# Patient Record
Sex: FEMALE | Race: WHITE | HISPANIC OR LATINO | Employment: FULL TIME | URBAN - METROPOLITAN AREA
[De-identification: names, ages, dates, MRNs, and addresses within clinical notes are randomized per-mention and may not be internally consistent; named-entity substitution may affect disease eponyms.]

---

## 2017-06-12 LAB
ABO + RH BLD: NORMAL
C TRACH RRNA SPEC QL PROBE: NEGATIVE
HCT VFR BLD AUTO: 42 % (ref 36–46)
HGB BLD-MCNC: 13.7 G/DL (ref 12–16)
INDIRECT COOMBS: NEGATIVE
MCV RBC AUTO: 92 FL (ref 82–108)
N GONORRHOEAE, AMPLIFIED DNA: NEGATIVE
PLATELET # BLD AUTO: 246 K/ΜL (ref 150–399)
RPR: NONREACTIVE
RUBELLA IMMUNE STATUS: NORMAL

## 2017-06-19 DIAGNOSIS — Z34.91 NORMAL PREGNANCY, FIRST TRIMESTER: Primary | ICD-10-CM

## 2017-07-10 ENCOUNTER — OFFICE VISIT (OUTPATIENT)
Dept: MATERNAL FETAL MEDICINE | Facility: CLINIC | Age: 27
End: 2017-07-10
Payer: MEDICAID

## 2017-07-10 DIAGNOSIS — Z36.89 ENCOUNTER TO ESTABLISH GESTATIONAL AGE USING ULTRASOUND: ICD-10-CM

## 2017-07-10 DIAGNOSIS — Z36.89 ENCOUNTER FOR FETAL ANATOMIC SURVEY: Primary | ICD-10-CM

## 2017-07-10 DIAGNOSIS — Z34.91 NORMAL PREGNANCY, FIRST TRIMESTER: ICD-10-CM

## 2017-07-10 PROCEDURE — 76801 OB US < 14 WKS SINGLE FETUS: CPT | Mod: PBBFAC | Performed by: OBSTETRICS & GYNECOLOGY

## 2017-07-10 PROCEDURE — 76801 OB US < 14 WKS SINGLE FETUS: CPT | Mod: 26,S$PBB,, | Performed by: OBSTETRICS & GYNECOLOGY

## 2017-07-10 PROCEDURE — 99499 UNLISTED E&M SERVICE: CPT | Mod: S$PBB,,, | Performed by: OBSTETRICS & GYNECOLOGY

## 2017-07-10 NOTE — PROGRESS NOTES
Indication  ========    Estimation of gestational age.    Method  ======    Transabdominal ultrasound examination. View: Sufficient.    Pregnancy  =========    Crawford pregnancy. Number of fetuses: 1.    Dating  ======    LMP on: 5/4/2017  GA by LMP 9 w + 4 d  NAOMI by LMP: 2/8/2018  Ultrasound examination on: 7/10/2017  GA by U/S based upon: CRL  GA by U/S 9 w + 0 d  NAOMI by U/S: 2/12/2018    Assessment  ==========    Gestational sac: visualized  Location: intrauterine  Yolk sac: visualized  Amniotic sac: visualized  Embryo: visualized  CRL 22.6 mm 9w 0d Hadlock  Cardiac activity: present   bpm  Other: Hypoechoic area adjacent to the gestational sac measuring [id number]    Maternal Structures  ===============    Uterus / Cervix  Uterus: Normal  Ovaries / Tubes / Adnexa  Rt ovary: Normal  Rt ovary D1 2.8 cm  Rt ovary D2 1.4 cm  Rt ovary D3 1.7 cm  Rt ovary mean 2.0 cm  Rt ovary vol 3.6 cm³  Lt ovary: Normal  Lt ovarian corpus luteum: visualized  Lt ovary D1 3.5 cm  Lt ovary D2 2.6 cm  Lt ovary D3 2.2 cm  Lt ovary mean 2.8 cm  Lt ovary vol 10.6 cm³  Lt ovarian corpus luteum D1 17.0 mm  Lt ovarian corpus luteum D2 16.0 mm  Lt ovarian corpus luteum D3 17.0 mm    Impression  =========    Single viable intrauterine pregnancy NOT consistent with LMP. NAOMI adjusted accordingly.  Embryo grossly WNL with normal cardiac activity.  Normal uterus, cervix and adnexae as noted above.  No fluid seen in cul-de-sac.    Recommendation  ==============    Repeat ultrasound study as clinically indicated.

## 2017-09-14 ENCOUNTER — OFFICE VISIT (OUTPATIENT)
Dept: MATERNAL FETAL MEDICINE | Facility: CLINIC | Age: 27
End: 2017-09-14
Payer: MEDICAID

## 2017-09-14 DIAGNOSIS — Z36.89 ENCOUNTER FOR FETAL ANATOMIC SURVEY: ICD-10-CM

## 2017-09-14 PROCEDURE — 99499 UNLISTED E&M SERVICE: CPT | Mod: S$PBB,,, | Performed by: OBSTETRICS & GYNECOLOGY

## 2017-09-14 PROCEDURE — 76805 OB US >/= 14 WKS SNGL FETUS: CPT | Mod: PBBFAC | Performed by: OBSTETRICS & GYNECOLOGY

## 2017-09-14 PROCEDURE — 76817 TRANSVAGINAL US OBSTETRIC: CPT | Mod: 26,S$PBB,, | Performed by: OBSTETRICS & GYNECOLOGY

## 2017-09-14 PROCEDURE — 76805 OB US >/= 14 WKS SNGL FETUS: CPT | Mod: 26,S$PBB,, | Performed by: OBSTETRICS & GYNECOLOGY

## 2017-09-14 PROCEDURE — 76817 TRANSVAGINAL US OBSTETRIC: CPT | Mod: PBBFAC | Performed by: OBSTETRICS & GYNECOLOGY

## 2017-09-14 NOTE — PROGRESS NOTES
OB Ultrasound Report (see PDF version under imaging tab)    Indication  ========    Fetal anatomy survey.    History  ======    Previous Outcomes   1  Para 0    Pregnancy History  ===============    Maternal Lab Tests  Test: Quad/ Penta Screen  Date: 2017  Result: DS risk of 1:1511; T18 risk not increased (AFP Tetra)  Wants to know gender: no    Method  ======    Transabdominal and transvaginal ultrasound examination. View: Good view.    Pregnancy  =========    Crawford pregnancy. Number of fetuses: 1.    Dating  ======    LMP on: 2017  Cycle: regular cycle  GA by LMP 19 w + 0 d  NAOMI by LMP: 2018  Ultrasound examination on: 2017  GA by U/S based upon: AC, BPD, Femur, HC  GA by U/S 18 w + 5 d  NAOMI by U/S: 2/10/2018  Assigned: Dating performed on 2017, based on the LMP  Assigned GA 19 w + 0 d  Assigned NAOMI: 2018    General Evaluation  ===============    Cardiac activity: present.  bpm.  Fetal movements: visualized.  Presentation: Variable lie.  Placenta:  Placental site: anterior.  Umbilical cord: normal, 3 vessel cord.  Amniotic fluid: normal amount.    Fetal Biometry  ===========    Fetal Biometry  BPD 41.8 mm 18w 5d Hadlock  OFD 55.2 mm 19w 4d Júnior  .0 mm 18w 5d Hadlock  .0 mm 19w 0d Hadlock  Femur 28.7 mm 18w 6d Hadlock  Cerebellum tr 19.1 mm 19w 1d [place]  CM 2.9 mm  Nuchal fold 4.19 mm  Humerus 28.3 mm 19w 1d Júnior   g  Calculated by: Hadlock (BPD-HC-AC-FL)  EFW (lb) 0 lb  EFW (oz) 9 oz  Cephalic index 0.76  HC / AC 1.17  FL / BPD 0.69  FL / AC 0.21   bpm  Head / Face / Neck   6.6 mm    Fetal Anatomy  ===========    Cranium: normal  Lateral ventricles: normal  Choroid plexus: normal  Midline falx: normal  Cavum septi pellucidi: normal  Cerebellum: normal  Cisterna magna: normal  Rt lateral ventricle: normal  Lt lateral ventricle: normal  Rt choroid plexus: normal  Nuchal  fold: normal  Lips: normal  Profile: normal  Nose: normal  RVOT: normal  LVOT: normal  4-chamber view: 4-chamber normal, septum normal  Situs: normal  Aortic arch: normal  Ductal arch: normal  3-vessel view: normal  Cardiac position: normal  Cardiac axis: normal  Cardiac size: normal  Cardiac rhythm: normal  Rt lung: normal  Lt lung: normal  Diaphragm: normal  Cord insertion: normal  Stomach: normal  Kidneys: normal  Bladder: normal  Genitals: normal  Abdom. wall: appears normal  Abdom. cavity: normal  Rt kidney: normal  Lt kidney: normal  Cervical spine: normal  Thoracic spine: normal  Lumbar spine: normal  Sacral spine: normal  Arms: normal  Legs: normal  Rt arm: normal  Lt arm: normal  Rt upper arm: normal  Rt forearm: normal  Rt hand: visualized  Rt hand: open  Lt upper arm: normal  Lt forearm: normal  Lt hand: visualized  Lt hand: open  Rt leg: normal  Lt leg: normal  Rt upper leg: normal  Rt lower leg: normal  Rt foot: normal  Lt upper leg: normal  Lt lower leg: normal  Lt foot: normal  Position of feet: normal  Wants to know gender: no    Maternal Structures  ===============    Uterus / Cervix  Uterus: Normal  Approach: w/o pressure  Cervical length 38.0 mm  Second approach: with pressure  Cervical length (2) 32.0 mm  Ovaries / Tubes / Adnexa  Rt ovary: Visualized  Lt ovary: Visualized    Impression  =========    A hancock living IUP is identified. Fetal size is appropriate for established dating. No fetal structural malformations are identified.  Cervical length could not be adequately assessed by TA scanning; therefore, a transvaginal scan was also performed. Cervical length  is normal, and placental location is normal without evidence of previa. Follow-up ultrasound as clinically indicated.

## 2017-10-31 LAB
GLUCOSE SERPL-MCNC: 86 MG/DL
HCT VFR BLD AUTO: 38 % (ref 36–46)
HGB BLD-MCNC: 12.7 G/DL (ref 12–16)
MCV RBC AUTO: 93 FL (ref 82–108)
PLATELET # BLD AUTO: 232 K/ΜL (ref 150–399)

## 2018-01-12 ENCOUNTER — INITIAL PRENATAL (OUTPATIENT)
Dept: OBSTETRICS AND GYNECOLOGY | Facility: CLINIC | Age: 28
End: 2018-01-12
Payer: MEDICAID

## 2018-01-12 VITALS — SYSTOLIC BLOOD PRESSURE: 100 MMHG | WEIGHT: 145.5 LBS | DIASTOLIC BLOOD PRESSURE: 60 MMHG

## 2018-01-12 DIAGNOSIS — Z34.03 ENCOUNTER FOR SUPERVISION OF NORMAL FIRST PREGNANCY IN THIRD TRIMESTER: Primary | ICD-10-CM

## 2018-01-12 PROBLEM — Z34.93 ENCOUNTER FOR SUPERVISION OF NORMAL PREGNANCY IN THIRD TRIMESTER: Status: ACTIVE | Noted: 2018-01-12

## 2018-01-12 PROBLEM — Z34.00 SUPERVISION OF NORMAL FIRST PREGNANCY, ANTEPARTUM: Status: ACTIVE | Noted: 2018-01-12

## 2018-01-12 PROCEDURE — 99213 OFFICE O/P EST LOW 20 MIN: CPT | Mod: PBBFAC,PO | Performed by: OBSTETRICS & GYNECOLOGY

## 2018-01-12 PROCEDURE — 99999 PR PBB SHADOW E&M-EST. PATIENT-LVL III: CPT | Mod: PBBFAC,,, | Performed by: OBSTETRICS & GYNECOLOGY

## 2018-01-12 PROCEDURE — 99203 OFFICE O/P NEW LOW 30 MIN: CPT | Mod: TH,S$PBB,, | Performed by: OBSTETRICS & GYNECOLOGY

## 2018-01-12 PROCEDURE — 87081 CULTURE SCREEN ONLY: CPT

## 2018-01-12 NOTE — PROGRESS NOTES
Complaints today: Presents as transfer from [facility]. No complaints. Denies contractions, LOF, VB. Reports good FM.     /60   Wt 66 kg (145 lb 8.1 oz)   LMP 2017 (Exact Date)     27 y.o., at 36w1d by Estimated Date of Delivery: 18  Patient Active Problem List   Diagnosis    Supervision of normal first pregnancy, antepartum     OB History    Para Term  AB Living   1             SAB TAB Ectopic Multiple Live Births                  # Outcome Date GA Lbr Magnus/2nd Weight Sex Delivery Anes PTL Lv   1 Current                   Dating reviewed    Allergies and problem list reviewed and updated    Medical and surgical history reviewed    Prenatal labs reviewed and updated    Physical Exam:  ABD: soft, gravid, nontender, size < dates (32cm)    Assessment:  Routine OB    Plan:   Repeat GBS (+ at [facility], but PCN allergic and no sensitivities reported)  HIV, RPR, CBC  Growth US for size < dates  Follow up 1 Weeks, bleeding/pain, kick counts, labor precautions     Elza Tomlin MD  OB/GYN, PGY-2

## 2018-01-16 ENCOUNTER — LAB VISIT (OUTPATIENT)
Dept: LAB | Facility: OTHER | Age: 28
End: 2018-01-16
Payer: MEDICAID

## 2018-01-16 DIAGNOSIS — Z34.03 ENCOUNTER FOR SUPERVISION OF NORMAL FIRST PREGNANCY IN THIRD TRIMESTER: ICD-10-CM

## 2018-01-16 LAB
BASOPHILS # BLD AUTO: 0.02 K/UL
BASOPHILS NFR BLD: 0.2 %
DIFFERENTIAL METHOD: ABNORMAL
EOSINOPHIL # BLD AUTO: 0 K/UL
EOSINOPHIL NFR BLD: 0.3 %
ERYTHROCYTE [DISTWIDTH] IN BLOOD BY AUTOMATED COUNT: 13.6 %
HCT VFR BLD AUTO: 36.6 %
HGB BLD-MCNC: 12.1 G/DL
LYMPHOCYTES # BLD AUTO: 1.5 K/UL
LYMPHOCYTES NFR BLD: 12.9 %
MCH RBC QN AUTO: 31.1 PG
MCHC RBC AUTO-ENTMCNC: 33.1 G/DL
MCV RBC AUTO: 94 FL
MONOCYTES # BLD AUTO: 0.6 K/UL
MONOCYTES NFR BLD: 5 %
NEUTROPHILS # BLD AUTO: 9.4 K/UL
NEUTROPHILS NFR BLD: 81.3 %
PLATELET # BLD AUTO: 231 K/UL
PMV BLD AUTO: 12.9 FL
RBC # BLD AUTO: 3.89 M/UL
WBC # BLD AUTO: 11.51 K/UL

## 2018-01-16 PROCEDURE — 86592 SYPHILIS TEST NON-TREP QUAL: CPT

## 2018-01-16 PROCEDURE — 36415 COLL VENOUS BLD VENIPUNCTURE: CPT

## 2018-01-16 PROCEDURE — 85025 COMPLETE CBC W/AUTO DIFF WBC: CPT

## 2018-01-17 LAB
BACTERIA SPEC AEROBE CULT: NORMAL
RPR SER QL: NORMAL

## 2018-01-19 ENCOUNTER — OFFICE VISIT (OUTPATIENT)
Dept: MATERNAL FETAL MEDICINE | Facility: CLINIC | Age: 28
End: 2018-01-19
Payer: MEDICAID

## 2018-01-19 DIAGNOSIS — Z34.03 ENCOUNTER FOR SUPERVISION OF NORMAL FIRST PREGNANCY IN THIRD TRIMESTER: ICD-10-CM

## 2018-01-19 DIAGNOSIS — Z03.74 SUSPECTED PROBLEM WITH FETAL GROWTH NOT FOUND: ICD-10-CM

## 2018-01-19 DIAGNOSIS — O26.843 SIZE OF FETUS INCONSISTENT WITH DATES IN THIRD TRIMESTER: ICD-10-CM

## 2018-01-19 PROCEDURE — 76816 OB US FOLLOW-UP PER FETUS: CPT | Mod: PBBFAC | Performed by: OBSTETRICS & GYNECOLOGY

## 2018-01-19 PROCEDURE — 76816 OB US FOLLOW-UP PER FETUS: CPT | Mod: 26,S$PBB,, | Performed by: OBSTETRICS & GYNECOLOGY

## 2018-01-19 PROCEDURE — 99499 UNLISTED E&M SERVICE: CPT | Mod: S$PBB,,, | Performed by: OBSTETRICS & GYNECOLOGY

## 2018-01-19 NOTE — LETTER
January 19, 2018      Elza Tomlin MD  [address]  [facility] [address]           [facility]  [address]  [facility] [address]  Phone: [phone]          Patient: Reggie Hargrove   MR Number: [id number]   YOB: 1990   Date of Visit: 1/19/2018       Dear Dr. Elza Tomlin:    Thank you for referring Reggie Hargrove to me for evaluation. Attached you will find relevant portions of my assessment and plan of care.    If you have questions, please do not hesitate to call me. I look forward to following Reggie Hargrove along with you.    Sincerely,    Ravi Flores MD    Enclosure  CC:  No Recipients    If you would like to receive this communication electronically, please contact [email or web address] or [phone] to request more information on [employer] access.    For providers and/or their staff who would like to refer a patient to [facility], please contact us through our one-stop-shop provider referral line, [facility], at [phone].    If you feel you have received this communication in error or would no longer like to receive these types of communications, please e-mail [email or web address]

## 2018-01-19 NOTE — PROGRESS NOTES
Obstetrical ultrasound completed today.  See report in imaging section of EPIC.  Normal growth (23rd) and fluid.

## 2018-01-22 ENCOUNTER — TELEPHONE (OUTPATIENT)
Dept: OBSTETRICS AND GYNECOLOGY | Facility: CLINIC | Age: 28
End: 2018-01-22

## 2018-01-22 NOTE — TELEPHONE ENCOUNTER
----- Message from Tali Meneses sent at 1/22/2018  1:36 PM CST -----  Contact: pt  _ x 1st Request  _  2nd Request  _  3rd Request      Who:pt    Why: calling in regards to an appointment     What Number to Call Back: [phone]    When to Expect a call back: (Before the end of the day)   -- if call after 3:00 call back will be tomorrow.

## 2018-01-26 ENCOUNTER — ROUTINE PRENATAL (OUTPATIENT)
Dept: OBSTETRICS AND GYNECOLOGY | Facility: CLINIC | Age: 28
End: 2018-01-26
Payer: MEDICAID

## 2018-01-26 VITALS — WEIGHT: 145.75 LBS | DIASTOLIC BLOOD PRESSURE: 60 MMHG | SYSTOLIC BLOOD PRESSURE: 100 MMHG

## 2018-01-26 DIAGNOSIS — Z34.03 ENCOUNTER FOR SUPERVISION OF NORMAL FIRST PREGNANCY IN THIRD TRIMESTER: ICD-10-CM

## 2018-01-26 PROCEDURE — 99999 PR PBB SHADOW E&M-EST. PATIENT-LVL II: CPT | Mod: PBBFAC,,, | Performed by: OBSTETRICS & GYNECOLOGY

## 2018-01-26 PROCEDURE — 3008F BODY MASS INDEX DOCD: CPT | Mod: ,,, | Performed by: OBSTETRICS & GYNECOLOGY

## 2018-01-26 PROCEDURE — 99212 OFFICE O/P EST SF 10 MIN: CPT | Mod: PBBFAC,TH,PO | Performed by: OBSTETRICS & GYNECOLOGY

## 2018-01-26 PROCEDURE — 99213 OFFICE O/P EST LOW 20 MIN: CPT | Mod: TH,S$PBB,, | Performed by: OBSTETRICS & GYNECOLOGY

## 2018-01-26 NOTE — PROGRESS NOTES
Complaints today:none  Denies contractions, LOF, VB, decreased fetal movement.  Measuring small and not gaining weight -growth US shows appropriate growth at 23%ile.    /60   Wt 66.1 kg (145 lb 11.6 oz)   LMP 2017 (Exact Date)     27 y.o., at 38w1d by Estimated Date of Delivery: 18  Patient Active Problem List   Diagnosis    Supervision of normal first pregnancy, antepartum     OB History    Para Term  AB Living   1             SAB TAB Ectopic Multiple Live Births                  # Outcome Date GA Lbr Magnus/2nd Weight Sex Delivery Anes PTL Lv   1 Current                   Dating reviewed    Allergies and problem list reviewed and updated    Medical and surgical history reviewed    Prenatal labs reviewed and updated    Physical Exam:  ABD: soft, gravid, nontender  SVE: /-2    Assessment:  IUP at 38w1d    Plan:   Routine PNC   follow up 1 Week, kick counts, labor precautions reviewed    Andreina Vargas M.D.  PGY-3 ObGyn  [phone]

## 2018-02-01 ENCOUNTER — ROUTINE PRENATAL (OUTPATIENT)
Dept: OBSTETRICS AND GYNECOLOGY | Facility: CLINIC | Age: 28
End: 2018-02-01
Payer: MEDICAID

## 2018-02-01 VITALS — DIASTOLIC BLOOD PRESSURE: 60 MMHG | SYSTOLIC BLOOD PRESSURE: 110 MMHG | WEIGHT: 147.69 LBS

## 2018-02-01 DIAGNOSIS — Z34.00 SUPERVISION OF NORMAL FIRST PREGNANCY, ANTEPARTUM: Primary | ICD-10-CM

## 2018-02-01 PROCEDURE — 99999 PR PBB SHADOW E&M-EST. PATIENT-LVL II: CPT | Mod: PBBFAC,,, | Performed by: STUDENT IN AN ORGANIZED HEALTH CARE EDUCATION/TRAINING PROGRAM

## 2018-02-01 PROCEDURE — 3008F BODY MASS INDEX DOCD: CPT | Mod: ,,, | Performed by: STUDENT IN AN ORGANIZED HEALTH CARE EDUCATION/TRAINING PROGRAM

## 2018-02-01 PROCEDURE — 99213 OFFICE O/P EST LOW 20 MIN: CPT | Mod: TH,S$PBB,, | Performed by: STUDENT IN AN ORGANIZED HEALTH CARE EDUCATION/TRAINING PROGRAM

## 2018-02-01 PROCEDURE — 99212 OFFICE O/P EST SF 10 MIN: CPT | Mod: PBBFAC,TH,PO | Performed by: STUDENT IN AN ORGANIZED HEALTH CARE EDUCATION/TRAINING PROGRAM

## 2018-02-01 NOTE — PROGRESS NOTES
Complaints today: none  + Irregular contractions.  No VB, LOF or decreased fetal movement.    LMP 2017 (Exact Date)     27 y.o., at 39w0d by Estimated Date of Delivery: 18  Patient Active Problem List   Diagnosis    Supervision of normal first pregnancy, antepartum     OB History    Para Term  AB Living   1             SAB TAB Ectopic Multiple Live Births                  # Outcome Date GA Lbr Magnus/2nd Weight Sex Delivery Anes PTL Lv   1 Current                   Dating reviewed    Allergies and problem list reviewed and updated    Medical and surgical history reviewed    Prenatal labs reviewed and updated    Physical Exam:  ABD: soft, gravid, nontender  SVE: /-2    Assessment:  IUP at 39w0d    Plan:   Induction scheduled for    Follow up 1 Weeks  Kick counts, labor precautions reviewed  Andreina Vargas M.D.  PGY-3 ObGyn  [phone]

## 2018-02-06 ENCOUNTER — TELEPHONE (OUTPATIENT)
Dept: OBSTETRICS AND GYNECOLOGY | Facility: CLINIC | Age: 28
End: 2018-02-06

## 2018-02-06 NOTE — TELEPHONE ENCOUNTER
----- Message from Jaja Cabrera sent at 2/5/2018  3:01 PM CST -----  Contact: pt  X_  1st Request  _  2nd Request  _  3rd Request        Who:SHADIA EMERSON [[id number]]    Why: Patient states she would like to speak with the staff in regards to being induced on Thursday... Please contact to further discuss and advise    What Number to Call Back: [phone]    When to Expect a call back: (Before the end of the day)   -- if call after 3:00 call back will be tomorrow.

## 2018-02-07 ENCOUNTER — ANESTHESIA (OUTPATIENT)
Dept: OBSTETRICS AND GYNECOLOGY | Facility: OTHER | Age: 28
End: 2018-02-07
Payer: MEDICAID

## 2018-02-07 ENCOUNTER — HOSPITAL ENCOUNTER (INPATIENT)
Facility: OTHER | Age: 28
LOS: 3 days | Discharge: HOME | End: 2018-02-10
Attending: OBSTETRICS & GYNECOLOGY | Admitting: OBSTETRICS & GYNECOLOGY
Payer: MEDICAID

## 2018-02-07 ENCOUNTER — ANESTHESIA EVENT (OUTPATIENT)
Dept: OBSTETRICS AND GYNECOLOGY | Facility: OTHER | Age: 28
End: 2018-02-07
Payer: MEDICAID

## 2018-02-07 DIAGNOSIS — O42.90 LEAKAGE, AMNIOTIC FLUID: Primary | ICD-10-CM

## 2018-02-07 DIAGNOSIS — Z3A.39 39 WEEKS GESTATION OF PREGNANCY: ICD-10-CM

## 2018-02-07 LAB
ABO + RH BLD: NORMAL
BASOPHILS # BLD AUTO: 0.02 K/UL
BASOPHILS NFR BLD: 0.2 %
BLD GP AB SCN CELLS X3 SERPL QL: NORMAL
DIFFERENTIAL METHOD: ABNORMAL
EOSINOPHIL # BLD AUTO: 0 K/UL
EOSINOPHIL NFR BLD: 0.3 %
ERYTHROCYTE [DISTWIDTH] IN BLOOD BY AUTOMATED COUNT: 13.6 %
HCT VFR BLD AUTO: 34.7 %
HGB BLD-MCNC: 11.4 G/DL
HIV1+2 IGG SERPL QL IA.RAPID: NEGATIVE
LYMPHOCYTES # BLD AUTO: 1.5 K/UL
LYMPHOCYTES NFR BLD: 11.4 %
MCH RBC QN AUTO: 30.5 PG
MCHC RBC AUTO-ENTMCNC: 32.9 G/DL
MCV RBC AUTO: 93 FL
MONOCYTES # BLD AUTO: 0.7 K/UL
MONOCYTES NFR BLD: 5.1 %
NEUTROPHILS # BLD AUTO: 10.5 K/UL
NEUTROPHILS NFR BLD: 82.4 %
PLATELET # BLD AUTO: 218 K/UL
PMV BLD AUTO: 12.9 FL
RBC # BLD AUTO: 3.74 M/UL
WBC # BLD AUTO: 12.71 K/UL

## 2018-02-07 PROCEDURE — 86901 BLOOD TYPING SEROLOGIC RH(D): CPT

## 2018-02-07 PROCEDURE — 85025 COMPLETE CBC W/AUTO DIFF WBC: CPT

## 2018-02-07 PROCEDURE — 59025 FETAL NON-STRESS TEST: CPT

## 2018-02-07 PROCEDURE — 86703 HIV-1/HIV-2 1 RESULT ANTBDY: CPT | Mod: 91

## 2018-02-07 PROCEDURE — 59025 FETAL NON-STRESS TEST: CPT | Mod: 26,,, | Performed by: OBSTETRICS & GYNECOLOGY

## 2018-02-07 PROCEDURE — 99285 EMERGENCY DEPT VISIT HI MDM: CPT | Mod: 25

## 2018-02-07 PROCEDURE — 63600175 PHARM REV CODE 636 W HCPCS: Performed by: STUDENT IN AN ORGANIZED HEALTH CARE EDUCATION/TRAINING PROGRAM

## 2018-02-07 PROCEDURE — 25000003 PHARM REV CODE 250: Performed by: STUDENT IN AN ORGANIZED HEALTH CARE EDUCATION/TRAINING PROGRAM

## 2018-02-07 PROCEDURE — 11000001 HC ACUTE MED/SURG PRIVATE ROOM

## 2018-02-07 PROCEDURE — 87340 HEPATITIS B SURFACE AG IA: CPT

## 2018-02-07 PROCEDURE — 99283 EMERGENCY DEPT VISIT LOW MDM: CPT | Mod: 25,,, | Performed by: OBSTETRICS & GYNECOLOGY

## 2018-02-07 PROCEDURE — 86703 HIV-1/HIV-2 1 RESULT ANTBDY: CPT

## 2018-02-07 RX ORDER — CARBOPROST TROMETHAMINE 250 UG/ML
250 INJECTION, SOLUTION INTRAMUSCULAR
Status: DISCONTINUED | OUTPATIENT
Start: 2018-02-07 | End: 2018-02-08

## 2018-02-07 RX ORDER — SODIUM CHLORIDE, SODIUM LACTATE, POTASSIUM CHLORIDE, CALCIUM CHLORIDE 600; 310; 30; 20 MG/100ML; MG/100ML; MG/100ML; MG/100ML
INJECTION, SOLUTION INTRAVENOUS CONTINUOUS
Status: DISCONTINUED | OUTPATIENT
Start: 2018-02-07 | End: 2018-02-08

## 2018-02-07 RX ORDER — OXYTOCIN/RINGER'S LACTATE 20/1000 ML
41.7 PLASTIC BAG, INJECTION (ML) INTRAVENOUS CONTINUOUS
Status: ACTIVE | OUTPATIENT
Start: 2018-02-07 | End: 2018-02-07

## 2018-02-07 RX ORDER — OXYTOCIN/RINGER'S LACTATE 20/1000 ML
20 PLASTIC BAG, INJECTION (ML) INTRAVENOUS ONCE
Status: DISCONTINUED | OUTPATIENT
Start: 2018-02-07 | End: 2018-02-08

## 2018-02-07 RX ORDER — METHYLERGONOVINE MALEATE 0.2 MG/ML
200 INJECTION INTRAVENOUS
Status: DISCONTINUED | OUTPATIENT
Start: 2018-02-07 | End: 2018-02-08

## 2018-02-07 RX ORDER — ONDANSETRON 8 MG/1
8 TABLET, ORALLY DISINTEGRATING ORAL EVERY 8 HOURS PRN
Status: DISCONTINUED | OUTPATIENT
Start: 2018-02-07 | End: 2018-02-08

## 2018-02-07 RX ORDER — OXYTOCIN/RINGER'S LACTATE 20/1000 ML
41.65 PLASTIC BAG, INJECTION (ML) INTRAVENOUS CONTINUOUS
Status: DISCONTINUED | OUTPATIENT
Start: 2018-02-07 | End: 2018-02-08

## 2018-02-07 RX ORDER — MISOPROSTOL 200 UG/1
600 TABLET ORAL
Status: DISCONTINUED | OUTPATIENT
Start: 2018-02-07 | End: 2018-02-08

## 2018-02-07 RX ORDER — SODIUM CHLORIDE 9 MG/ML
INJECTION, SOLUTION INTRAVENOUS
Status: DISCONTINUED | OUTPATIENT
Start: 2018-02-07 | End: 2018-02-08

## 2018-02-07 RX ORDER — OXYTOCIN/RINGER'S LACTATE 20/1000 ML
333 PLASTIC BAG, INJECTION (ML) INTRAVENOUS CONTINUOUS
Status: ACTIVE | OUTPATIENT
Start: 2018-02-07 | End: 2018-02-07

## 2018-02-07 RX ORDER — OXYTOCIN/RINGER'S LACTATE 20/1000 ML
2 PLASTIC BAG, INJECTION (ML) INTRAVENOUS CONTINUOUS
Status: DISCONTINUED | OUTPATIENT
Start: 2018-02-07 | End: 2018-02-08

## 2018-02-07 RX ORDER — OXYTOCIN/RINGER'S LACTATE 20/1000 ML
10 PLASTIC BAG, INJECTION (ML) INTRAVENOUS
Status: DISCONTINUED | OUTPATIENT
Start: 2018-02-07 | End: 2018-02-08

## 2018-02-07 RX ADMIN — SODIUM CHLORIDE, SODIUM LACTATE, POTASSIUM CHLORIDE, AND CALCIUM CHLORIDE: .6; .31; .03; .02 INJECTION, SOLUTION INTRAVENOUS at 08:02

## 2018-02-07 RX ADMIN — Medication 2 MILLI-UNITS/MIN: at 08:02

## 2018-02-08 LAB
BASOPHILS # BLD AUTO: 0.02 K/UL
BASOPHILS NFR BLD: 0.1 %
DIFFERENTIAL METHOD: ABNORMAL
EOSINOPHIL # BLD AUTO: 0.1 K/UL
EOSINOPHIL NFR BLD: 0.4 %
ERYTHROCYTE [DISTWIDTH] IN BLOOD BY AUTOMATED COUNT: 13.7 %
HBV SURFACE AG SERPL QL IA: NEGATIVE
HCT VFR BLD AUTO: 34.2 %
HGB BLD-MCNC: 11.2 G/DL
HIV 1+2 AB+HIV1 P24 AG SERPL QL IA: NEGATIVE
LYMPHOCYTES # BLD AUTO: 1.8 K/UL
LYMPHOCYTES NFR BLD: 9.9 %
MCH RBC QN AUTO: 30.7 PG
MCHC RBC AUTO-ENTMCNC: 32.7 G/DL
MCV RBC AUTO: 94 FL
MONOCYTES # BLD AUTO: 1 K/UL
MONOCYTES NFR BLD: 5.4 %
NEUTROPHILS # BLD AUTO: 15 K/UL
NEUTROPHILS NFR BLD: 83.8 %
PLATELET # BLD AUTO: 213 K/UL
PMV BLD AUTO: 12.9 FL
RBC # BLD AUTO: 3.65 M/UL
WBC # BLD AUTO: 17.95 K/UL

## 2018-02-08 PROCEDURE — 0KQM0ZZ REPAIR PERINEUM MUSCLE, OPEN APPROACH: ICD-10-PCS | Performed by: OBSTETRICS & GYNECOLOGY

## 2018-02-08 PROCEDURE — 85025 COMPLETE CBC W/AUTO DIFF WBC: CPT

## 2018-02-08 PROCEDURE — 25000003 PHARM REV CODE 250: Performed by: STUDENT IN AN ORGANIZED HEALTH CARE EDUCATION/TRAINING PROGRAM

## 2018-02-08 PROCEDURE — 62326 NJX INTERLAMINAR LMBR/SAC: CPT | Performed by: ANESTHESIOLOGY

## 2018-02-08 PROCEDURE — 27800517 HC TRAY,EPIDURAL-CONTINUOUS: Performed by: ANESTHESIOLOGY

## 2018-02-08 PROCEDURE — 27200710 HC EPIDURAL INFUSION PUMP SET: Performed by: ANESTHESIOLOGY

## 2018-02-08 PROCEDURE — 72200005 HC VAGINAL DELIVERY LEVEL II

## 2018-02-08 PROCEDURE — 59409 OBSTETRICAL CARE: CPT | Mod: GB,,, | Performed by: OBSTETRICS & GYNECOLOGY

## 2018-02-08 PROCEDURE — 59409 OBSTETRICAL CARE: CPT | Mod: AA,,, | Performed by: ANESTHESIOLOGY

## 2018-02-08 PROCEDURE — 51702 INSERT TEMP BLADDER CATH: CPT

## 2018-02-08 PROCEDURE — 11000001 HC ACUTE MED/SURG PRIVATE ROOM

## 2018-02-08 PROCEDURE — 25000003 PHARM REV CODE 250: Performed by: ANESTHESIOLOGY

## 2018-02-08 PROCEDURE — 36415 COLL VENOUS BLD VENIPUNCTURE: CPT

## 2018-02-08 RX ORDER — OXYTOCIN/RINGER'S LACTATE 20/1000 ML
41.65 PLASTIC BAG, INJECTION (ML) INTRAVENOUS CONTINUOUS
Status: ACTIVE | OUTPATIENT
Start: 2018-02-08 | End: 2018-02-08

## 2018-02-08 RX ORDER — FENTANYL/BUPIVACAINE/NS/PF 2MCG/ML-.1
PLASTIC BAG, INJECTION (ML) INJECTION
Status: DISPENSED
Start: 2018-02-08 | End: 2018-02-08

## 2018-02-08 RX ORDER — OXYCODONE AND ACETAMINOPHEN 10; 325 MG/1; MG/1
1 TABLET ORAL EVERY 4 HOURS PRN
Status: DISCONTINUED | OUTPATIENT
Start: 2018-02-08 | End: 2018-02-10 | Stop reason: HOSPADM

## 2018-02-08 RX ORDER — SODIUM CITRATE AND CITRIC ACID MONOHYDRATE 334; 500 MG/5ML; MG/5ML
30 SOLUTION ORAL ONCE
Status: DISCONTINUED | OUTPATIENT
Start: 2018-02-08 | End: 2018-02-08

## 2018-02-08 RX ORDER — IBUPROFEN 600 MG/1
600 TABLET ORAL EVERY 6 HOURS
Qty: 90 TABLET | Refills: 0 | Status: SHIPPED | OUTPATIENT
Start: 2018-02-08

## 2018-02-08 RX ORDER — DIPHENHYDRAMINE HCL 25 MG
25 CAPSULE ORAL EVERY 4 HOURS PRN
Status: DISCONTINUED | OUTPATIENT
Start: 2018-02-08 | End: 2018-02-10 | Stop reason: HOSPADM

## 2018-02-08 RX ORDER — IBUPROFEN 600 MG/1
600 TABLET ORAL EVERY 6 HOURS
Status: DISCONTINUED | OUTPATIENT
Start: 2018-02-08 | End: 2018-02-10 | Stop reason: HOSPADM

## 2018-02-08 RX ORDER — DOCUSATE SODIUM 100 MG/1
200 CAPSULE, LIQUID FILLED ORAL 2 TIMES DAILY PRN
Status: DISCONTINUED | OUTPATIENT
Start: 2018-02-08 | End: 2018-02-10 | Stop reason: HOSPADM

## 2018-02-08 RX ORDER — FAMOTIDINE 10 MG/ML
20 INJECTION INTRAVENOUS ONCE
Status: DISCONTINUED | OUTPATIENT
Start: 2018-02-08 | End: 2018-02-08

## 2018-02-08 RX ORDER — OXYCODONE AND ACETAMINOPHEN 5; 325 MG/1; MG/1
1 TABLET ORAL EVERY 4 HOURS PRN
Status: DISCONTINUED | OUTPATIENT
Start: 2018-02-08 | End: 2018-02-10 | Stop reason: HOSPADM

## 2018-02-08 RX ORDER — DIPHENHYDRAMINE HCL 25 MG
25 CAPSULE ORAL ONCE
Status: COMPLETED | OUTPATIENT
Start: 2018-02-08 | End: 2018-02-08

## 2018-02-08 RX ORDER — HYDROCORTISONE 25 MG/G
CREAM TOPICAL 3 TIMES DAILY PRN
Status: DISCONTINUED | OUTPATIENT
Start: 2018-02-08 | End: 2018-02-10 | Stop reason: HOSPADM

## 2018-02-08 RX ORDER — FENTANYL/BUPIVACAINE/NS/PF 2MCG/ML-.1
PLASTIC BAG, INJECTION (ML) INJECTION CONTINUOUS PRN
Status: DISCONTINUED | OUTPATIENT
Start: 2018-02-08 | End: 2018-02-08

## 2018-02-08 RX ORDER — ONDANSETRON 8 MG/1
8 TABLET, ORALLY DISINTEGRATING ORAL EVERY 8 HOURS PRN
Status: DISCONTINUED | OUTPATIENT
Start: 2018-02-08 | End: 2018-02-10 | Stop reason: HOSPADM

## 2018-02-08 RX ORDER — FENTANYL/BUPIVACAINE/NS/PF 2MCG/ML-.1
PLASTIC BAG, INJECTION (ML) INJECTION CONTINUOUS
Status: DISCONTINUED | OUTPATIENT
Start: 2018-02-08 | End: 2018-02-08

## 2018-02-08 RX ORDER — DIPHENHYDRAMINE HYDROCHLORIDE 50 MG/ML
25 INJECTION INTRAMUSCULAR; INTRAVENOUS EVERY 4 HOURS PRN
Status: DISCONTINUED | OUTPATIENT
Start: 2018-02-08 | End: 2018-02-10 | Stop reason: HOSPADM

## 2018-02-08 RX ADMIN — SODIUM CHLORIDE, SODIUM LACTATE, POTASSIUM CHLORIDE, AND CALCIUM CHLORIDE: .6; .31; .03; .02 INJECTION, SOLUTION INTRAVENOUS at 12:02

## 2018-02-08 RX ADMIN — Medication 10 ML/HR: at 12:02

## 2018-02-08 RX ADMIN — OXYCODONE HYDROCHLORIDE AND ACETAMINOPHEN 1 TABLET: 5; 325 TABLET ORAL at 05:02

## 2018-02-08 RX ADMIN — IBUPROFEN 600 MG: 600 TABLET, FILM COATED ORAL at 11:02

## 2018-02-08 RX ADMIN — OXYCODONE HYDROCHLORIDE AND ACETAMINOPHEN 1 TABLET: 5; 325 TABLET ORAL at 10:02

## 2018-02-08 RX ADMIN — IBUPROFEN 600 MG: 600 TABLET, FILM COATED ORAL at 05:02

## 2018-02-08 RX ADMIN — DIPHENHYDRAMINE HYDROCHLORIDE 25 MG: 25 CAPSULE ORAL at 04:02

## 2018-02-08 RX ADMIN — DOCUSATE SODIUM 200 MG: 100 CAPSULE, LIQUID FILLED ORAL at 10:02

## 2018-02-08 RX ADMIN — ONDANSETRON 8 MG: 8 TABLET, ORALLY DISINTEGRATING ORAL at 02:02

## 2018-02-08 NOTE — PROGRESS NOTES
LABOR PROGRESS NOTE    S:  MD to bedside for cervical exam. Pt comfortable with epidural    O: Temp:  [96.8 °F (36 °C)-98.3 °F (36.8 °C)] 97.9 °F (36.6 °C)  Pulse:  [63-96] 78  Resp:  [16-20] 20  SpO2:  [91 %-100 %] 100 %  BP: ()/(52-82) 110/55    FHT: 120 BPM/moderate beat to beat variability/-accels/occ early decels Cat 1 (reassuring)  CTX: q2-3  SVE: 9.5/90/0, pitocin @ 10 mU    ASSESSMENT:   27 y.o.  at 39w6d, labor augmentation    FHT reassuring    Active Hospital Problems    Diagnosis  POA    *Normal labor and delivery [O80]  Not Applicable      Resolved Hospital Problems    Diagnosis Date Resolved POA   No resolved problems to display.     PLAN:    Labor management  Continue Close Maternal/Fetal Monitoring.   Labor course:   1515: SROM @ home  1830: /-3 in KYUNG, pitocin initiated @ 2 mU  2230: 3/70/-2, pitocin @ 4 mU, IUPC placed  0015: epidural placed  1245: /-1, per nursing the IUPC fell out, given cervical change and adequate ctx pattern, will not place another IUPC at this time, continue pitocin @ 8 mU  0230: /0, continue pitocin @ 8mU/min  0430: 9.590/0, pitocin @ 10 mU  Recheck 1-2 hours or PRN    Marci Witt MD

## 2018-02-08 NOTE — HOSPITAL COURSE
2018 Admit to L&D for labor augmentation given SROM @ 1515 today. Augmented with pitocin, epidural placed, IUPC placed. SVE with no complications, second degree laceration.  2018 PPD#1 s/p . Doing well this morning, meeting all postpartum goals (ambulating, pain well-controlled, tolerating reg diet without n/v, passing flatus, voiding spontaneously).  02/10/2018 PPD#2 s/p . She continues to be doing well this morning, meeting goals

## 2018-02-08 NOTE — PROGRESS NOTES
02/08/18 0021   Vital Signs   BP (!) 142/82   MAP (mmHg) 104   Pulse 96   Heart Rate Source NIBP   SpO2 100 %   sitting for epidural

## 2018-02-08 NOTE — H&P
[facility]  Obstetrics  History & Physical    Patient Name: Reggie Hargrove  MRN: [id number]  Admission Date: 2018  Primary Care Provider: Primary Doctor No    Subjective:     Principal Problem:Normal labor and delivery    History of Present Illness:  Reggie Hargrove is a 27 y.o. F at 39w6d admitted from [facility] for SROM.  Patient denies contractions, denies vaginal bleeding, reports LOF.   Fetal Movement: normal.  This IUP is uncomplicated.        Obstetric History       T0      L0     SAB0   TAB0   Ectopic0   Multiple0   Live Births0       # Outcome Date GA Lbr Magnus/2nd Weight Sex Delivery Anes PTL Lv   1 Current                 History reviewed. No pertinent past medical history.  History reviewed. No pertinent surgical history.      (Not in a hospital admission)    Review of patient's allergies indicates:   Allergen Reactions    Pcn [penicillins]         Family History     Problem Relation (Age of Onset)    Breast cancer Paternal Grandmother, Maternal Grandmother    Colon cancer Paternal Grandmother, Maternal Grandmother    Ovarian cancer Paternal Grandmother, Maternal Grandmother        Social History Main Topics    Smoking status: Never Smoker    Smokeless tobacco: Never Used    Alcohol use No    Drug use: No    Sexual activity: Yes     Review of Systems   Constitutional: Negative for activity change, chills and fatigue.   Eyes: Negative for visual disturbance.   Respiratory: Negative for shortness of breath.    Cardiovascular: Negative for chest pain and palpitations.   Gastrointestinal: Negative for abdominal pain, constipation, diarrhea, nausea and vomiting.   Genitourinary: Positive for vaginal discharge (amniotic fluid). Negative for dysuria, vaginal bleeding, vaginal pain and vaginal odor.   Musculoskeletal: Negative for myalgias.   Skin:  Negative for rash.   Neurological: Negative for headaches.   Psychiatric/Behavioral: Negative for depression.      Objective:     Vital  Signs (Most Recent):  Temp: 96.8 °F (36 °C) (18)  Pulse: 82 (18 1834)  Resp: 18 (18 174)  BP: (!) 88/52 (18)  SpO2: 99 % (18) Vital Signs (24h Range):  Temp:  [96.8 °F (36 °C)] 96.8 °F (36 °C)  Pulse:  [78-84] 82  Resp:  [18] 18  SpO2:  [98 %-100 %] 99 %  BP: ()/(52-76) 88/52      There is no height or weight on file to calculate BMI.    FHT: Cat 1 (reassuring) 140 bpm, + accels, - decels, moderate BTBV  TOCO: no contractions, minimal irritability    Physical Exam:   Constitutional: She is oriented to person, place, and time. She appears well-developed and well-nourished.    HENT:   Head: Normocephalic and atraumatic.    Eyes: Conjunctivae and EOM are normal. Pupils are equal, round, and reactive to light.    Neck: Normal range of motion. Neck supple.    Cardiovascular: Normal rate, regular rhythm and normal heart sounds.     Pulmonary/Chest: Effort normal and breath sounds normal. No respiratory distress.        Abdominal: Soft. Bowel sounds are normal. She exhibits no distension. There is no tenderness. There is no rebound and no guarding.             Musculoskeletal: Normal range of motion and moves all extremeties.       Neurological: She is alert and oriented to person, place, and time.    Skin: Skin is warm and dry. No rash noted.    Psychiatric: She has a normal mood and affect. Her behavior is normal. Judgment and thought content normal.     Cervix:  Dilation:  2  Effacement:  75%  Station: -3  Presentation: Vertex     Significant Labs:  Lab Results   Component Value Date    GROUPTRH O POS 2017    STREPBCULT No Group B Streptococcus isolated 2018     I have personallly reviewed all pertinent lab results from the last 24 hours.    Assessment/Plan:     27 y.o. female  at 39w6d for:    * Normal labor and delivery    - Admit to Labor and Delivery unit  - Consents for delivery including vaginal or  section and blood transfusion signed and  to chart  - Risks, benefits, alternatives and possible complications have been discussed in detail with the patient.   - Epidural per Anesthesia  - Draw CBC, T&S, HIV, HepBsAg  - Notify Staff  - will start pitocin immediately for labor augmentation  - Recheck in 2 hrs or PRN      Post-Partum Hemorrhage risk - low             Ginger Medina MD  Obstetrics  [facility]

## 2018-02-08 NOTE — PROGRESS NOTES
LABOR PROGRESS NOTE    S:  MD to bedside for cervical exam. Complaints: No.  No epidural at this time    O: Temp:  [96.8 °F (36 °C)-98.3 °F (36.8 °C)] 98.3 °F (36.8 °C)  Pulse:  [73-92] 73  Resp:  [18-20] 18  SpO2:  [92 %-100 %] 99 %  BP: ()/(52-76) 116/73      FHT: 130 BPM/moderate beat to beat variability/-accels/-decels Cat 1 (reassuring)  CTX: irregular  SVE: 3/70/-2, SROM @ 1515, IUPC placed, pitocin @ 4 mU    ASSESSMENT:   27 y.o.  at 39w6d, labor augmentation    FHT reassuring    Active Hospital Problems    Diagnosis  POA    *Normal labor and delivery [O80]  Not Applicable      Resolved Hospital Problems    Diagnosis Date Resolved POA   No resolved problems to display.     PLAN:    Labor management  Continue Close Maternal/Fetal Monitoring.   Labor course:   1515: SROM @ home  1830: /-3 in KYUNG, pitocin initiated @ 2 mU  2230: 3/70/-2, pitocin @ 4 mU, IUPC placed  Recheck 2 hours or PRN    Contractions are inadequate and irregular.   Increase pitocin per protocol to titrate to no more than 5 ctx/10 min  Epidural per anesthesia when indicated    Ginger Medina MD

## 2018-02-08 NOTE — ANESTHESIA PROCEDURE NOTES
Epidural    Patient location during procedure: OB   Reason for block: primary anesthetic   Diagnosis: iup in labor   Start time: 2/8/2018 12:05 AM  Timeout: 2/8/2018 12:04 AM  End time: 2/8/2018 12:25 AM  Staffing  Anesthesiologist: MELINDA HERRING  Resident/CRNA: TIERNEY WEI  Performed: resident/CRNA   Preanesthetic Checklist  Completed: patient identified, site marked, surgical consent, pre-op evaluation, timeout performed, IV checked, risks and benefits discussed, monitors and equipment checked, anesthesia consent given, hand hygiene performed and patient being monitored  Preparation  Patient position: sitting  Prep: ChloraPrep  Patient monitoring: Pulse Ox and Blood Pressure  Epidural  Skin Anesthetic: lidocaine 1%  Skin Wheal: 3 mL  Administration type: continuous  Approach: midline  Interspace: L3-4  Injection technique: SUNNI saline  Needle and Epidural Catheter  Needle type: Tuohy   Needle gauge: 17  Needle length: 3.5 inches  Needle insertion depth: 4 cm  Catheter type: springwound  Catheter size: 19 G  Catheter at skin depth: 9 cm  Test dose: 3 mL of lidocaine 1.5% with Epi 1-to-200,000  Additional Documentation: incremental injection, negative aspiration for heme and CSF, no paresthesia on injection, no signs/symptoms of IV or SA injection, no significant complaints from patient and no significant pain on injection  Needle localization: anatomical landmarks  Medications:  Bolus administered: 10 mL of 0.1% bupivacaine  Volume per aspiration: 5 mL  Time between aspirations: 3 minutes  Assessment  Ease of block: easy  Patient's tolerance of the procedure: comfortable throughout block and no complaints  Post dural Puncture Headache?: No

## 2018-02-08 NOTE — PROGRESS NOTES
LABOR PROGRESS NOTE    S:  MD to bedside for cervical exam. Pt just got epidural, starting to feel more comfortable.    O: Temp:  [96.8 °F (36 °C)-98.3 °F (36.8 °C)] 97.9 °F (36.6 °C)  Pulse:  [63-96] 70  Resp:  [16-20] 16  SpO2:  [91 %-100 %] 100 %  BP: ()/(52-82) 117/68    FHT: 125 BPM/moderate beat to beat variability/-accels/-decels Cat 1 (reassuring)  CTX: q2-3  SVE: 3/70/-2, SROM @ 1515, IUPC placed, pitocin @ 8 mU    ASSESSMENT:   27 y.o.  at 39w6d, labor augmentation    FHT reassuring    Active Hospital Problems    Diagnosis  POA    *Normal labor and delivery [O80]  Not Applicable      Resolved Hospital Problems    Diagnosis Date Resolved POA   No resolved problems to display.     PLAN:    Labor management  Continue Close Maternal/Fetal Monitoring.   Labor course:   1515: SROM @ home  1830: 3 in KYUNG, pitocin initiated @ 2 mU  2230: 3/70/-2, pitocin @ 4 mU, IUPC placed  0015: epidural placed  1245: 1, per nursing the IUPC fell out, given cervical change and adequate ctx pattern, will not place another IUPC at this time, continue pitocin @ 8 mU  Recheck 2 hours or PRN    Marci Witt MD

## 2018-02-08 NOTE — SUBJECTIVE & OBJECTIVE
Obstetric History       T0      L0     SAB0   TAB0   Ectopic0   Multiple0   Live Births0       # Outcome Date GA Lbr Magnus/2nd Weight Sex Delivery Anes PTL Lv   1 Current                 History reviewed. No pertinent past medical history.  History reviewed. No pertinent surgical history.      (Not in a hospital admission)    Review of patient's allergies indicates:   Allergen Reactions    Pcn [penicillins]         Family History     Problem Relation (Age of Onset)    Breast cancer Paternal Grandmother, Maternal Grandmother    Colon cancer Paternal Grandmother, Maternal Grandmother    Ovarian cancer Paternal Grandmother, Maternal Grandmother        Social History Main Topics    Smoking status: Never Smoker    Smokeless tobacco: Never Used    Alcohol use No    Drug use: No    Sexual activity: Yes     Review of Systems   Constitutional: Negative for activity change, chills and fatigue.   Eyes: Negative for visual disturbance.   Respiratory: Negative for shortness of breath.    Cardiovascular: Negative for chest pain and palpitations.   Gastrointestinal: Negative for abdominal pain, constipation, diarrhea, nausea and vomiting.   Genitourinary: Positive for vaginal discharge (amniotic fluid). Negative for dysuria, vaginal bleeding, vaginal pain and vaginal odor.   Musculoskeletal: Negative for myalgias.   Skin:  Negative for rash.   Neurological: Negative for headaches.   Psychiatric/Behavioral: Negative for depression.      Objective:     Vital Signs (Most Recent):  Temp: 96.8 °F (36 °C) (18)  Pulse: 82 (18)  Resp: 18 (18)  BP: (!) 88/52 (18)  SpO2: 99 % (18) Vital Signs (24h Range):  Temp:  [96.8 °F (36 °C)] 96.8 °F (36 °C)  Pulse:  [78-84] 82  Resp:  [18] 18  SpO2:  [98 %-100 %] 99 %  BP: ()/(52-76) 88/52      There is no height or weight on file to calculate BMI.    FHT: Cat 1 (reassuring) 140 bpm, + accels, - decels, moderate  BTBV  TOCO: no contractions, minimal irritability    Physical Exam:   Constitutional: She is oriented to person, place, and time. She appears well-developed and well-nourished.    HENT:   Head: Normocephalic and atraumatic.    Eyes: Conjunctivae and EOM are normal. Pupils are equal, round, and reactive to light.    Neck: Normal range of motion. Neck supple.    Cardiovascular: Normal rate, regular rhythm and normal heart sounds.     Pulmonary/Chest: Effort normal and breath sounds normal. No respiratory distress.        Abdominal: Soft. Bowel sounds are normal. She exhibits no distension. There is no tenderness. There is no rebound and no guarding.             Musculoskeletal: Normal range of motion and moves all extremeties.       Neurological: She is alert and oriented to person, place, and time.    Skin: Skin is warm and dry. No rash noted.    Psychiatric: She has a normal mood and affect. Her behavior is normal. Judgment and thought content normal.     Cervix:  Dilation:  2  Effacement:  75%  Station: -3  Presentation: Vertex     Significant Labs:  Lab Results   Component Value Date    GROUPTRH O POS 06/12/2017    STREPBCULT No Group B Streptococcus isolated 01/12/2018     I have personallly reviewed all pertinent lab results from the last 24 hours.

## 2018-02-08 NOTE — ED PROVIDER NOTES
Encounter Date: 2018       History     Chief Complaint   Patient presents with    Rupture of Membranes     Reggie Hargrove is a 27 y.o. F at 39w6d presents complaining of leakage of fluid. Experienced gush of fluid at 1515 this afternoon. Was previously scheduled for IOL tomorrow morning.    This IUP is uncomplicated.  Patient denies contractions, denies vaginal bleeding, reports LOF.   Fetal Movement: normal.    Blood Type O POS   GBBS: negative  Rubella: Immune  RPR: NR  HIV: negative  HepB: pending            Review of patient's allergies indicates:   Allergen Reactions    Pcn [penicillins]      History reviewed. No pertinent past medical history.  History reviewed. No pertinent surgical history.  Family History   Problem Relation Age of Onset    Breast cancer Paternal Grandmother     Colon cancer Paternal Grandmother     Ovarian cancer Paternal Grandmother     Breast cancer Maternal Grandmother     Colon cancer Maternal Grandmother     Ovarian cancer Maternal Grandmother      Social History   Substance Use Topics    Smoking status: Never Smoker    Smokeless tobacco: Never Used    Alcohol use No     Review of Systems   Constitutional: Negative for chills, fatigue and fever.   HENT: Negative for congestion.    Eyes: Negative for visual disturbance.   Respiratory: Negative for cough and shortness of breath.    Cardiovascular: Negative for chest pain and palpitations.   Gastrointestinal: Negative for abdominal distention, abdominal pain, constipation, diarrhea, nausea and vomiting.   Genitourinary: Positive for vaginal discharge (amniotic fluid). Negative for difficulty urinating, dysuria, hematuria and vaginal bleeding.   Skin: Negative for rash.   Neurological: Negative for dizziness, seizures, light-headedness and headaches.   Hematological: Does not bruise/bleed easily.   Psychiatric/Behavioral: Negative for dysphoric mood. The patient is not nervous/anxious.        Physical Exam       Physical  Exam    Constitutional: She appears well-developed and well-nourished. She is not diaphoretic. No distress.   HENT:   Head: Normocephalic and atraumatic.   Eyes: Conjunctivae are normal.   Neck: Neck supple.   Cardiovascular: Normal rate and regular rhythm.   Pulmonary/Chest: No respiratory distress.   Abdominal: Soft. There is no tenderness. There is no rebound and no guarding.   Gravid, fundus NT   Genitourinary:   Genitourinary Comments: SSE: normal external genitalia, normal appearing vaginal and cervical mucosa; grossly ruptured  SVE: uterus gravid; no tenderness over bilateral adnexae; no masses palpable  + pooling  + nitrazine     Neurological: She is alert and oriented to person, place, and time.   Skin: Skin is warm.   Psychiatric: She has a normal mood and affect. Her behavior is normal. Judgment and thought content normal.     OB LABOR EXAM:   Pre-Term Labor: No.   Membranes ruptured: Yes.   Method: Sterile vaginal exam per MD and Sterile speculum exam per MD.   Vaginal Bleeding: none present.     Dilatation: 2.   Station: -3.   Effacement: 70%.   Amniotic Fluid Color: normal.   Amniotic Fluid Amount: moderate.         ED Course   Obtain Fetal nonstress test (NST)  Date/Time: 2/7/2018 6:28 PM  Performed by: FELIX JAFFE  Authorized by: FELIX JAFFE     Nonstress Test:     Variability:  6-25 BPM    Decelerations:  None    Accelerations:  15 bpm    Acoustic Stimulator: No      Baseline:  140    Uterine Irritability: No      Contractions:  Not present  Biophysical Profile:     Nonstress Test Interpretation: reactive      Overall Impression:  Reassuring  Post-procedure:     Patient tolerance:  Patient tolerated the procedure well with no immediate complications      Labs Reviewed - No data to display          Medical Decision Making:   Initial Assessment:   IUP @ 39.6, SROM  Differential Diagnosis:   SROM  Normal labor  ED Management:  - admit to L&D  - pitocin for labor  augmentation  Other:   I have discussed this case with another health care provider.              Attending Attestation:   Physician Attestation Statement for Resident:  As the supervising MD   Physician Attestation Statement: I have personally seen and examined this patient.   I agree with the above history. -:   As the supervising MD I agree with the above PE.    As the supervising MD I agree with the above treatment, course, plan, and disposition.  I was personally present during the critical portions of the procedure(s) performed by the resident and was immediately available in the ED to provide services and assistance as needed during the entire procedure.  I have reviewed and agree with the residents interpretation of the following: rhythm strips.  I have reviewed the following: old records at this facility.                    ED Course as of Feb 10 2036   Wed Feb 07, 2018   1822 Ms. Hargrove is here at 39wga with gross ROM.  Fetal status is reactive and reassuring.  Will admit to labor and delivery  [HU]      ED Course User Index  [HU] Sammi Davila DO     Clinical Impression:         G1 at 39.6wga with rupture of membranes                 Gniger Medina MD  Resident  02/07/18 1837       Sammi Davila DO  02/10/18 2038

## 2018-02-08 NOTE — ASSESSMENT & PLAN NOTE
- Admit to Labor and Delivery unit  - Consents for delivery including vaginal or  section and blood transfusion signed and to chart  - Risks, benefits, alternatives and possible complications have been discussed in detail with the patient.   - Epidural per Anesthesia  - Draw CBC, T&S, HIV, HepBsAg  - Notify Staff  - will start pitocin immediately for labor augmentation  - Recheck in 2 hrs or PRN      Post-Partum Hemorrhage risk - low

## 2018-02-08 NOTE — HPI
Reggie Hargrove is a 27 y.o. F at 39w6d admitted from [facility] for SROM.  Patient denies contractions, denies vaginal bleeding, reports LOF.   Fetal Movement: normal.  This IUP is uncomplicated.

## 2018-02-08 NOTE — L&D DELIVERY NOTE
[facility]  Vaginal Delivery   Operative Note    SUMMARY     Normal spontaneous vaginal delivery of live infant, was placed on mothers abdomen for skin to skin and bulb suctioning performed.  Infant delivered position OA over perineum.  Nuchal cord: No.    Spontaneous delivery of placenta and IV pitocin given noting good uterine tone.  2nd degree laceration noted and repaired in normal fashion with chromic.  Patient tolerated delivery well. Sponge needle and lap counted correctly x2.        Indications: Normal labor and delivery  Pregnancy complicated by:   Patient Active Problem List   Diagnosis    Supervision of normal first pregnancy, antepartum    Normal labor and delivery     (spontaneous vaginal delivery)     Admitting GA: 40w0d    Delivery Information for  Fausto Hargrove    Birth information:  YOB: 2017   Time of birth: 6:46 AM   Sex: female   Head Delivery Date/Time: 2018  6:46 AM   Delivery type: Vaginal, Spontaneous Delivery   Gestational Age: 40w0d    Delivery Providers    Delivering clinician:  Ernesto Awan MD   Other personnel:   Provider Role   Erasto Martinez RN Registered Nurse   Mimi Atkinson RN Registered Nurse   Natalia SHERWOOD Wick[place]fe Surgical Tech   Marci Witt MD Resident               [place] Measurements    Weight:  2970 g Length:  47 cm   Head circum.:  33 cm Chest circum.:  31.1 cm          [place] Assessment    Living status:  Living  Apgars:     1 Minute:   5 Minute:   10 Minute:   15 Minute:   20 Minute:     Skin Color:   0  1       Heart Rate:   2  2       Reflex Irritability:   2  2       Muscle Tone:   2  2       Respiratory Effort:   2  2       Total:   8  9                      Assisted Delivery Details:    Forceps attempted?:  No  Vacuum extractor attempted?:  No         Shoulder Dystocia    Shoulder dystocia present?:  No           Presentation and Position    Presentation:   Vertex   Position:       Occiput    Anterior             Interventions/Resuscitation         Cord    Vessels:  3 vessels  Complications:  None  Delayed Cord Clamping?:  Yes  Cord Clamped Date/Time:  2018  6:47 AM  Cord Blood Disposition:  Sent with Baby  Gases Sent?:  No  Stem Cell Collection (by MD):  No       Placenta    Date and time:  2018  6:50 AM  Removal:  Spontaneous  Appearance:  Intact  Placenta disposition:  discarded           Labor Events:       labor: No     Labor Onset Date/Time:         Dilation Complete Date/Time: 2018 05:30     Start Pushing Date/Time: 2018 05:47     Rupture Date/Time: 18  1515         Rupture type:           Fluid Amount:        Fluid Color:        Fluid Odor:        Membrane Status (PeriCalm): SRM (Spontaneous Rupture)      Rupture Date/Time (PeriCalm): 2018 15:15:00      Fluid Amount (PeriCalm): Moderate      Fluid Color (PeriCalm): Clear       steroids: None     Antibiotics given for GBS: No     Induction:       Indications for induction:        Augmentation: oxytocin     Indications for augmentation:       Labor complications: None     Additional complications:          Cervical ripening:                     Delivery:      Episiotomy: None     Indication for Episiotomy:       Perineal Lacerations: 2nd Repaired:      Periurethral Laceration: none Repaired:     Labial Laceration: none Repaired:     Sulcus Laceration: none Repaired:     Vaginal Laceration: No Repaired:     Cervical Laceration: No Repaired:     Repair suture:       Repair # of packets: 2     Vaginal delivery QBL (mL): 150      QBL (mL): 0     Combined Blood Loss (mL): 150     Vaginal Sweep Performed: No     Surgicount Correct: No       Other providers:       Anesthesia    Method:  Epidural          Details (if applicable):  Trial of Labor      Categorization:      Priority:     Indications for :     Incision Type:       Additional  information:  Forceps:    Vacuum:     Breech:    Observed anomalies    Other (Comments):

## 2018-02-09 PROBLEM — O42.90 PROM (PREMATURE RUPTURE OF MEMBRANES): Status: ACTIVE | Noted: 2018-02-07

## 2018-02-09 PROCEDURE — 11000001 HC ACUTE MED/SURG PRIVATE ROOM

## 2018-02-09 PROCEDURE — 25000003 PHARM REV CODE 250: Performed by: STUDENT IN AN ORGANIZED HEALTH CARE EDUCATION/TRAINING PROGRAM

## 2018-02-09 PROCEDURE — 99231 SBSQ HOSP IP/OBS SF/LOW 25: CPT | Mod: ,,, | Performed by: OBSTETRICS & GYNECOLOGY

## 2018-02-09 RX ADMIN — OXYCODONE HYDROCHLORIDE AND ACETAMINOPHEN 1 TABLET: 10; 325 TABLET ORAL at 11:02

## 2018-02-09 RX ADMIN — IBUPROFEN 600 MG: 600 TABLET, FILM COATED ORAL at 06:02

## 2018-02-09 RX ADMIN — IBUPROFEN 600 MG: 600 TABLET, FILM COATED ORAL at 12:02

## 2018-02-09 RX ADMIN — IBUPROFEN 600 MG: 600 TABLET, FILM COATED ORAL at 05:02

## 2018-02-09 RX ADMIN — OXYCODONE HYDROCHLORIDE AND ACETAMINOPHEN 1 TABLET: 10; 325 TABLET ORAL at 02:02

## 2018-02-09 RX ADMIN — IBUPROFEN 600 MG: 600 TABLET, FILM COATED ORAL at 11:02

## 2018-02-09 RX ADMIN — OXYCODONE HYDROCHLORIDE AND ACETAMINOPHEN 1 TABLET: 10; 325 TABLET ORAL at 06:02

## 2018-02-09 RX ADMIN — OXYCODONE HYDROCHLORIDE AND ACETAMINOPHEN 1 TABLET: 10; 325 TABLET ORAL at 10:02

## 2018-02-09 NOTE — LACTATION NOTE
02/09/18 0945   Maternal Infant Feeding   Maternal Emotional State independent;relaxed   Time Spent (min) 0-15 min   Latch Assistance (to call)   lactation rounds. Pt to call for latch assessment/ assistance.

## 2018-02-09 NOTE — DISCHARGE INSTRUCTIONS
Breastfeeding Discharge Instructions       Feed the baby at the earliest sign of hunger or comfort  o Hands to mouth, sucking motions  o Rooting or searching for something to suck on  o Dont wait for crying - it is a sign of distress     The feedings may be 8-12 times per 24hrs and will not follow a schedule   Avoid pacifiers and bottles for the first 4 weeks   Alternate the breast you start the feeding with, or start with the breast that feels the fullest   Switch breasts when the baby takes himself off the breast or falls asleep   Keep offering breasts until the baby looks full, no longer gives hunger signs, and stays asleep when placed on his back in the crib   If the baby is sleepy and wont wake for a feeding, put the baby skin-to-skin dressed in a diaper against the mothers bare chest   Sleep near your baby   The baby should be positioned and latched on to the breast correctly  o Chest-to-chest, chin in the breast  o Babys lips are flipped outward  o Babys mouth is stretched open wide like a shout  o Babys sucking should feel like tugging to the mother  - The baby should be drinking at the breast:  o You should hear swallowing or gulping throughout the feeding  o You should see milk on the babys lips when he comes off the breast  o Your breasts should be softer when the baby is finished feeding  o The baby should look relaxed at the end of feedings  o After the 4th day and your milk is in:  o The babys poop should turn bright yellow and be loose, watery, and seedy  o The baby should have at least 3-4 poops the size of the palm of your hand per day  o The baby should have at least 5-6 wet diapers per day  o The urine should be light yellow in color  You should drink when you are thirsty and eat a healthy diet when you are    hungry.     Take naps to get the rest you need.   Take medications and/or drink alcohol only with permission of your obstetrician    or the babys pediatrician.  You can  also call the Infant Risk Center,   ([phone]), Monday-Friday, 8am-5pm Central time, to get the most   up-to-date evidence-based information on the use of medications during   pregnancy and breastfeeding.      The baby should be examined by a pediatrician at 3-5 days of age.  Once your   milk comes in, the baby should be gaining at least ½ - 1oz each day and should be back to birthweight no later than 10-14 days of age.          Community Resources    [facility] Breastfeeding Warmline: [phone]   Local [facility] clinics: provide incentives and breastpumps to eligible mothers  [employer]he [employer] (LLLI):  mother-to-mother support group website        www.[employer]  Local La Leche League mother-to-mother support groups:        www.[employer]        La Leche League [facility]   Dr. Eliel Fenton website for latch videos and general information:        www.breastfeedinginc.ca  Infant Risk Center is a call center that provides information about the safety of taking medications while breastfeeding.  Call 1-816.617.1803, M-F, 8am-5pm, CT.  International Lactation Consultant Association provides resources for assistance:        www.ilca.org  [facility] Breastfeeding Coalition provides informationand resources for parents  and the community    http://[facility]feeding.org     Betzy Mora is a mom-to-mom support group:                             www.[employer].com//breastfeedng-support/  Partners for Healthy Babies:  2-707-068-BABY(2546)  [employer]: a breastfeeding support group for women of color, 660.985.3344

## 2018-02-09 NOTE — PLAN OF CARE
Problem: Breastfeeding (Adult,Obstetrics,Pediatric)  Goal: Signs and Symptoms of Listed Potential Problems Will be Absent, Minimized or Managed (Breastfeeding)  Signs and symptoms of listed potential problems will be absent, minimized or managed by discharge/transition of care (reference Breastfeeding (Adult,Obstetrics,Pediatric) CPG).    02/09/18 1448   Breastfeeding   Problems Assessed (Breastfeeding) all   Problems Present (Breastfeeding) none   Follow basic education

## 2018-02-09 NOTE — LACTATION NOTE
02/09/18 1315   Maternal Infant Assessment   Breast Density Bilateral:;soft   Areola Bilateral:;elastic   Nipple(s) Bilateral:;everted   Infant Assessment   Sucking Reflex present   Rooting Reflex present   Swallow Reflex present   LATCH Score   Latch 2-->grasps breast, tongue down, lips flanged, rhythmic sucking   Audible Swallowing 1-->a few with stimulation   Type Of Nipple 2-->everted (after stimulation)   Comfort (Breast/Nipple) 2-->soft/nontender   Hold (Positioning) 1-->minimal assist, teach one side: mother does other, staff holds   Score (less than 7 for 2/more consecutive times, consult Lactation Consultant) 8   Breasts WDL   Breasts WDL WDL   Pain/Comfort Assessments   Pain Assessment Performed Yes       Number Scale   Presence of Pain denies   Location nipple(s)   Pain Rating: Rest 0   Pain Rating: Activity 0   Maternal Infant Feeding   Maternal Emotional State assist needed;relaxed   Infant Positioning cross-cradle   Signs of Milk Transfer infant jaw motion present   Presence of Pain no   Comfort Measures Before/During Feeding infant position adjusted;latch adjusted;maternal position adjusted   Time Spent (min) 15-30 min   Latch Assistance yes   Breastfeeding Education adequate infant intake;adequate milk volume;diet;importance of skin-to-skin contact;milk expression, hand   Feeding Infant   Effective Latch During Feeding yes   Suck/Swallow Coordination present   Skin-to-Skin Contact During Feeding yes   Lactation Referrals   Lactation Consult Breastfeeding assessment   Lactation Interventions   Attachment Promotion breastfeeding assistance provided   Breastfeeding Assistance assisted with positioning;feeding cue recognition promoted;feeding on demand promoted;infant latch-on verified;infant suck/swallow verified   Maternal Breastfeeding Support diary/feeding log utilized;encouragement offered;infant-mother separation minimized;lactation counseling provided;maternal hydration promoted;maternal  nutrition promoted;maternal rest encouraged   Latch Promotion positioning assisted   latch assistance provided. Assisted pt with achieving a deeper latch. Cross cradle hold;d. Baby nursing well.

## 2018-02-09 NOTE — SUBJECTIVE & OBJECTIVE
Hospital course: 2018 Admit to L&D for labor augmentation given SROM @ 1515 today. Augmented with pitocin, epidural placed, IUPC placed. SVE with no complications, second degree laceration.  2018 PPD#1 s/p . Doing well this morning, meeting all postpartum goals (ambulating, pain well-controlled, tolerating reg diet without n/v, passing flatus, voiding spontaneously).    Interval History: PPD#1 s/p     She is doing well this morning. She is tolerating a regular diet without nausea or vomiting. She is voiding spontaneously. She is ambulating. She has passed flatus, and has not a BM. Vaginal bleeding is mild. She denies fever or chills. Abdominal pain is mild and controlled with oral medications. She is breastfeeding.     Objective:     Vital Signs (Most Recent):  Temp: 97.6 °F (36.4 °C) (18)  Pulse: 71 (18)  Resp: 18 (18)  BP: (!) 108/57 (18)  SpO2: 97 % (18) Vital Signs (24h Range):  Temp:  [97.6 °F (36.4 °C)-99 °F (37.2 °C)] 97.6 °F (36.4 °C)  Pulse:  [68-88] 71  Resp:  [18] 18  SpO2:  [91 %-100 %] 97 %  BP: ()/(56-72) 108/57     Weight: 65.8 kg (145 lb)  Body mass index is 22.71 kg/m².      Intake/Output Summary (Last 24 hours) at 18  Last data filed at 18 1600   Gross per 24 hour   Intake           942.35 ml   Output             1620 ml   Net          -677.65 ml       Significant Labs:  Lab Results   Component Value Date    GROUPTRH O POS 2018    HEPBSAG Negative 2018    STREPBCULT No Group B Streptococcus isolated 2018       Recent Labs  Lab 18  1929   HGB 11.2*   HCT 34.2*       I have personallly reviewed all pertinent lab results from the last 24 hours.   Physical Exam   Constitutional: She is oriented to person, place, and time. She appears well-developed and well-nourished. No distress.   HENT:   Head: Normocephalic and atraumatic.   Eyes: EOM are normal.   Neck: Normal range of motion.    Cardiovascular: Normal rate.    Pulmonary/Chest: Effort normal. No respiratory distress.   Abdominal: Soft. She exhibits no mass. There is no tenderness. There is no guarding.   Fundus firm below umbilicus   Neurological: She is alert and oriented to person, place, and time.   Skin: Skin is warm and dry. She is not diaphoretic.   Psychiatric: She has a normal mood and affect. Her behavior is normal. Judgment and thought content normal.   Vitals reviewed.

## 2018-02-09 NOTE — ASSESSMENT & PLAN NOTE
Postpartum care:  - Patient doing well. Continue routine management and advances.  - Continue PO pain meds. Pain well controlled.  - Encourage ambulation.   - Heme: Pre Delivery h/h 11.4/34.7 --> Post Delivery h/h pending   - Contraception - condoms  - Lactation - The patient is breastfeeding. Lactation nurse following along PRN  - Rh Status - pos

## 2018-02-09 NOTE — PROGRESS NOTES
[facility]  Obstetrics  Postpartum Progress Note    Patient Name: Reggie Hargrove  MRN: [id number]  Admission Date: 2018  Hospital Length of Stay: 2 days  Attending Physician: CLEMENTINE Javed MD  Primary Care Provider: Primary Doctor No    Subjective:     Principal Problem:PROM (premature rupture of membranes)    Hospital course: 2018 Admit to L&D for labor augmentation given SROM @ 1515 today. Augmented with pitocin, epidural placed, IUPC placed. SVE with no complications, second degree laceration.  2018 PPD#1 s/p . Doing well this morning, meeting all postpartum goals (ambulating, pain well-controlled, tolerating reg diet without n/v, passing flatus, voiding spontaneously).    Interval History: PPD#1 s/p     She is doing well this morning. She is tolerating a regular diet without nausea or vomiting. She is voiding spontaneously. She is ambulating. She has passed flatus, and has not a BM. Vaginal bleeding is mild. She denies fever or chills. Abdominal pain is mild and controlled with oral medications. She is breastfeeding.     Objective:     Vital Signs (Most Recent):  Temp: 97.6 °F (36.4 °C) (18)  Pulse: 71 (18 234)  Resp: 18 (18)  BP: (!) 108/57 (18)  SpO2: 97 % (18) Vital Signs (24h Range):  Temp:  [97.6 °F (36.4 °C)-99 °F (37.2 °C)] 97.6 °F (36.4 °C)  Pulse:  [68-88] 71  Resp:  [18] 18  SpO2:  [91 %-100 %] 97 %  BP: ()/(56-72) 108/57     Weight: 65.8 kg (145 lb)  Body mass index is 22.71 kg/m².      Intake/Output Summary (Last 24 hours) at 18  Last data filed at 18 1600   Gross per 24 hour   Intake           942.35 ml   Output             1620 ml   Net          -677.65 ml       Significant Labs:  Lab Results   Component Value Date    GROUPTRH O POS 2018    HEPBSAG Negative 2018    STREPBCULT No Group B Streptococcus isolated 2018       Recent Labs  Lab 18  1929   HGB 11.2*    HCT 34.2*       I have personallly reviewed all pertinent lab results from the last 24 hours.   Physical Exam   Constitutional: She is oriented to person, place, and time. She appears well-developed and well-nourished. No distress.   HENT:   Head: Normocephalic and atraumatic.   Eyes: EOM are normal.   Neck: Normal range of motion.   Cardiovascular: Normal rate.    Pulmonary/Chest: Effort normal. No respiratory distress.   Abdominal: Soft. She exhibits no mass. There is no tenderness. There is no guarding.   Fundus firm below umbilicus   Neurological: She is alert and oriented to person, place, and time.   Skin: Skin is warm and dry. She is not diaphoretic.   Psychiatric: She has a normal mood and affect. Her behavior is normal. Judgment and thought content normal.   Vitals reviewed.        Assessment/Plan:     27 y.o. female  for:    * PROM (premature rupture of membranes)    - admitted for IOL for SROM  - s/p          (spontaneous vaginal delivery)    Postpartum care:  - Patient doing well. Continue routine management and advances.  - Continue PO pain meds. Pain well controlled.  - Encourage ambulation.   - Heme: Pre Delivery h/h 11.4/34.7 --> Post Delivery h/h pending   - Contraception - condoms  - Lactation - The patient is breastfeeding. Lactation nurse following along PRN  - Rh Status - pos                Disposition: As patient meets milestones, will plan to discharge PPD#1-2.    Sushma K Reddy, MD  Obstetrics  [facility]    Doing well, no questions,no problems.  I have reviewed the resident's note, evaluated the patient and agree with the diagnosis and management plan

## 2018-02-09 NOTE — ANESTHESIA POSTPROCEDURE EVALUATION
"Anesthesia Post Evaluation    Patient: Reggie Hargrove    Procedure(s) Performed: * No procedures listed *    Final Anesthesia Type: epidural  Patient location during evaluation: floor  Patient participation: Yes- Able to Participate  Level of consciousness: awake and alert  Post-procedure vital signs: reviewed and stable  Pain management: adequate  Airway patency: patent  PONV status at discharge: No PONV  Anesthetic complications: no      Cardiovascular status: blood pressure returned to baseline and hemodynamically stable  Respiratory status: unassisted, room air and spontaneous ventilation  Hydration status: euvolemic  Follow-up not needed.        Visit Vitals  /65   Pulse 72   Temp 36.3 °C (97.4 °F) (Oral)   Resp 18   Ht 5' 7" (1.702 m)   Wt 65.8 kg (145 lb)   LMP 05/04/2017 (Exact Date)   SpO2 98%   Breastfeeding? Yes   BMI 22.71 kg/m²       Pain/Salvatore Score: Pain Rating Prior to Med Admin: 7 (2/9/2018 11:00 AM)  Pain Rating Post Med Admin: 0 (2/9/2018 12:00 PM)      "

## 2018-02-09 NOTE — PLAN OF CARE
Problem: Breastfeeding (Adult,Obstetrics,Pediatric)  Goal: Signs and Symptoms of Listed Potential Problems Will be Absent, Minimized or Managed (Breastfeeding)  Signs and symptoms of listed potential problems will be absent, minimized or managed by discharge/transition of care (reference Breastfeeding (Adult,Obstetrics,Pediatric) C[facility]).    02/08/18 1840   Breastfeeding   Problems Assessed (Breastfeeding) all   Problems Present (Breastfeeding) none   Follow basic breast feeding education

## 2018-02-09 NOTE — LACTATION NOTE
02/08/18 1700   Breasts WDL   Breasts WDL WDL       Number Scale   Presence of Pain denies   Location nipple(s)   Pain Rating: Rest 0   Pain Rating: Activity 0   Maternal Infant Feeding   Maternal Emotional State relaxed   Time Spent (min) 15-30 min   Latch Assistance (to call)   Breastfeeding Education adequate infant intake;adequate milk volume;diet;importance of skin-to-skin contact   Lactation Referrals   Lactation Consult Knowledge deficit   Lactation Interventions   Attachment Promotion counseling provided   Breastfeeding Assistance feeding cue recognition promoted;feeding on demand promoted   basic education reviewed. Pt to call for latch assessment

## 2018-02-10 VITALS
WEIGHT: 145 LBS | OXYGEN SATURATION: 98 % | TEMPERATURE: 97 F | RESPIRATION RATE: 18 BRPM | BODY MASS INDEX: 22.76 KG/M2 | DIASTOLIC BLOOD PRESSURE: 62 MMHG | SYSTOLIC BLOOD PRESSURE: 106 MMHG | HEIGHT: 67 IN | HEART RATE: 74 BPM

## 2018-02-10 PROBLEM — Z34.00 SUPERVISION OF NORMAL FIRST PREGNANCY, ANTEPARTUM: Status: RESOLVED | Noted: 2018-01-12 | Resolved: 2018-02-10

## 2018-02-10 PROBLEM — O42.90 PROM (PREMATURE RUPTURE OF MEMBRANES): Status: RESOLVED | Noted: 2018-02-07 | Resolved: 2018-02-10

## 2018-02-10 PROCEDURE — 99238 HOSP IP/OBS DSCHRG MGMT 30/<: CPT | Mod: ,,, | Performed by: OBSTETRICS & GYNECOLOGY

## 2018-02-10 PROCEDURE — 25000003 PHARM REV CODE 250: Performed by: STUDENT IN AN ORGANIZED HEALTH CARE EDUCATION/TRAINING PROGRAM

## 2018-02-10 RX ORDER — OXYCODONE AND ACETAMINOPHEN 5; 325 MG/1; MG/1
1 TABLET ORAL EVERY 4 HOURS PRN
Qty: 30 TABLET | Refills: 0 | Status: SHIPPED | OUTPATIENT
Start: 2018-02-10

## 2018-02-10 RX ORDER — OXYCODONE AND ACETAMINOPHEN 5; 325 MG/1; MG/1
1 TABLET ORAL EVERY 4 HOURS PRN
Qty: 30 TABLET | Refills: 0 | Status: SHIPPED | OUTPATIENT
Start: 2018-02-10 | End: 2018-02-10

## 2018-02-10 RX ADMIN — IBUPROFEN 600 MG: 600 TABLET, FILM COATED ORAL at 02:02

## 2018-02-10 RX ADMIN — OXYCODONE HYDROCHLORIDE AND ACETAMINOPHEN 1 TABLET: 10; 325 TABLET ORAL at 12:02

## 2018-02-10 RX ADMIN — IBUPROFEN 600 MG: 600 TABLET, FILM COATED ORAL at 09:02

## 2018-02-10 RX ADMIN — OXYCODONE HYDROCHLORIDE AND ACETAMINOPHEN 1 TABLET: 10; 325 TABLET ORAL at 03:02

## 2018-02-10 NOTE — PLAN OF CARE
Problem: Patient Care Overview  Goal: Plan of Care Review  Outcome: Outcome(s) achieved Date Met: 02/10/18  Pt ambulating, voiding, and passing flatus. Pt tolerating po well. No s/s of distress at this time. Pt pain well controlled by oral pain medication. Pt bleeding light throughout shift and fundus is firm. Discharge instructions provided. Instructed to follow up with ob/gyn in 6 weeks, take pain medication as needed. Verbalized understanding. Awaiting transport for discharge.

## 2018-02-10 NOTE — PROGRESS NOTES
[facility]  Obstetrics  Labor Progress Note    Patient Name: Reggie Hargrove  MRN: [id number]  Admission Date: 2018  Hospital Length of Stay: 3 days  Attending Physician: CLEMENTINE Javed MD  Primary Care Provider: Primary Doctor No    Subjective:     Principal Problem: (spontaneous vaginal delivery)    Hospital Course:  2018 Admit to L&D for labor augmentation given SROM @ 1515 today. Augmented with pitocin, epidural placed, IUPC placed. SVE with no complications, second degree laceration.  2018 PPD#1 s/p . Doing well this morning, meeting all postpartum goals (ambulating, pain well-controlled, tolerating reg diet without n/v, passing flatus, voiding spontaneously).  02/10/2018 PPD#2 s/p . She continues to be doing well this morning, meeting goals    Hospital course: 2018 Admit to L&D for labor augmentation given SROM @ 1515 today. Augmented with pitocin, epidural placed, IUPC placed. SVE with no complications, second degree laceration.  2018 PPD#1 s/p . Doing well this morning, meeting all postpartum goals (ambulating, pain well-controlled, tolerating reg diet without n/v, passing flatus, voiding spontaneously).  02/10/2018 PPD#2 s/p . She continues to be doing well this morning, meeting goals    Interval History: Ms. Hargrove is doing well this morning. She is tolerating a regular diet without nausea or vomiting. She is voiding spontaneously. She is ambulating. She has passed flatus, and has not a BM. Vaginal bleeding is moderate. She denies fever or chills. Abdominal pain is mild and controlled with oral medications. She is breastfeeding. She desires circumcision for her male baby: yes.    Objective:     Vital Signs (Most Recent):  Temp: 97.4 °F (36.3 °C) (02/10/18 0730)  Pulse: 74 (02/10/18 0730)  Resp: 18 (02/10/18 0730)  BP: 106/62 (02/10/18 0730)  SpO2: 98 % (02/10/18 0730) Vital Signs (24h Range):  Temp:  [97.4 °F (36.3 °C)-98.7 °F (37.1 °C)] 97.4 °F  (36.3 °C)  Pulse:  [67-74] 74  Resp:  [18] 18  SpO2:  [98 %-99 %] 98 %  BP: ()/(53-62) 106/62     Weight: 65.8 kg (145 lb)  Body mass index is 22.71 kg/m².    No intake or output data in the 24 hours ending 02/10/18 1111    Significant Labs:  Lab Results   Component Value Date    GROUPTRH O POS 2018    HEPBSAG Negative 2018    STREPBCULT No Group B Streptococcus isolated 2018       Recent Labs  Lab 18  1929   HGB 11.2*   HCT 34.2*     Chlamydia: No results for input(s): LABCHLA in the last 48 hours.  I have personallly reviewed all pertinent lab results from the last 24 hours.    Assessment/Plan:     27 y.o. female  at 40w0d for:    *  (spontaneous vaginal delivery)    Postpartum care:  - Patient doing well. Continue routine management and advances.  - Continue PO pain meds. Pain well controlled.  - Encourage ambulation.   - Heme: Pre Delivery h/h 11.4/34.7 --> Post Delivery h/h pending   - Contraception - condoms  - Lactation - The patient is breastfeeding. Lactation nurse following along PRN  - Rh Status - pos                  Haley Lam MD  Obstetrics  [facility]

## 2018-02-10 NOTE — DISCHARGE SUMMARY
[facility]  Obstetrics  Discharge Summary      Patient Name: Reggie Hargrove  MRN: [id number]  Admission Date: 2018  Hospital Length of Stay: 3 days  Discharge Date and Time:  02/10/2018 11:45 AM  Attending Physician: CLEMENTINE Javed MD   Discharging Provider: Haley Lam MD  Primary Care Provider: Primary Doctor No    HPI: Reggie Hargrove is a 27 y.o. F at 39w6d admitted from [facility] for SROM.  Patient denies contractions, denies vaginal bleeding, reports LOF.   Fetal Movement: normal.  This IUP is uncomplicated.      * No surgery found *     Hospital Course:   2018 Admit to L&D for labor augmentation given SROM @ 1515 today. Augmented with pitocin, epidural placed, IUPC placed. SVE with no complications, second degree laceration.  2018 PPD#1 s/p . Doing well this morning, meeting all postpartum goals (ambulating, pain well-controlled, tolerating reg diet without n/v, passing flatus, voiding spontaneously).  02/10/2018 PPD#2 s/p . She continues to be doing well this morning, meeting goals        Final Active Diagnoses:    Diagnosis Date Noted POA    PRINCIPAL PROBLEM:   (spontaneous vaginal delivery) [O80] 2018 Not Applicable      Problems Resolved During this Admission:    Diagnosis Date Noted Date Resolved POA    PROM (premature rupture of membranes) [O42.90] 2018 02/10/2018 Unknown        Labs:   CBC   Recent Labs  Lab 18   WBC 17.95*   HGB 11.2*   HCT 34.2*          Feeding Method: breast    Immunizations     Date Immunization Status Dose Route/Site Given by    18 MMR Incomplete 0.5 mL Subcutaneous/Left deltoid     18 Tdap Incomplete 0.5 mL Intramuscular/Left deltoid           Delivery:    Episiotomy: None   Lacerations: 2nd   Repair suture:     Repair # of packets: 2   Blood loss (ml): 150     Birth information:  YOB: 2017   Time of birth: 6:46 AM   Sex: female   Delivery type: Vaginal,  Spontaneous Delivery   Gestational Age: 40w0d    Delivery Clinician:      Other providers:       Additional  information:  Forceps:    Vacuum:    Breech:    Observed anomalies      Living?:           APGARS  One minute Five minutes Ten minutes   Skin color:         Heart rate:         Grimace:         Muscle tone:         Breathing:         Totals: 8  9        Placenta: Delivered:       appearance    Pending Diagnostic Studies:     None          Discharged Condition: good    Disposition: Home or Self Care    Follow Up:  Follow-up Information     [facility], OB/GYN In 6 weeks.    Why:  For post-partum visit               Patient Instructions:     Activity as tolerated     Lifting restrictions     Other restrictions (specify):   Order Comments: Pelvic rest until postpartum visit. (No sex, no tampons, no douching, etc.)     Call MD for:  temperature >100.4     Call MD for:  persistent nausea and vomiting or diarrhea     Call MD for:  severe uncontrolled pain     Call MD for:  difficulty breathing or increased cough     Call MD for:  severe persistent headache     Call MD for:  persistent dizziness, light-headedness, or visual disturbances     Call MD for:  increased confusion or weakness     Call MD for:   Order Comments: Vaginal bleeding through one or more pads each hour for 2 hours       Medications:  Current Discharge Medication List      START taking these medications    Details   ibuprofen (ADVIL,MOTRIN) 600 MG tablet Take 1 tablet (600 mg total) by mouth every 6 (six) hours.  Qty: 90 tablet, Refills: 0      oxyCODONE-acetaminophen (PERCOCET) 5-325 mg per tablet Take 1 tablet by mouth every 4 (four) hours as needed.  Qty: 30 tablet, Refills: 0             Haley Lam MD  Obstetrics  [facility]    Doing well, no complaints, ready for D/C.

## 2018-02-10 NOTE — SUBJECTIVE & OBJECTIVE
Hospital course: 2018 Admit to L&D for labor augmentation given SROM @ 1515 today. Augmented with pitocin, epidural placed, IUPC placed. SVE with no complications, second degree laceration.  2018 PPD#1 s/p . Doing well this morning, meeting all postpartum goals (ambulating, pain well-controlled, tolerating reg diet without n/v, passing flatus, voiding spontaneously).  02/10/2018 PPD#2 s/p . She continues to be doing well this morning, meeting goals    Interval History: Ms. Hargrove is doing well this morning. She is tolerating a regular diet without nausea or vomiting. She is voiding spontaneously. She is ambulating. She has passed flatus, and has not a BM. Vaginal bleeding is moderate. She denies fever or chills. Abdominal pain is mild and controlled with oral medications. She is breastfeeding. She desires circumcision for her male baby: yes.    Objective:     Vital Signs (Most Recent):  Temp: 97.4 °F (36.3 °C) (02/10/18 0730)  Pulse: 74 (02/10/18 0730)  Resp: 18 (02/10/18 0730)  BP: 106/62 (02/10/18 0730)  SpO2: 98 % (02/10/18 0730) Vital Signs (24h Range):  Temp:  [97.4 °F (36.3 °C)-98.7 °F (37.1 °C)] 97.4 °F (36.3 °C)  Pulse:  [67-74] 74  Resp:  [18] 18  SpO2:  [98 %-99 %] 98 %  BP: ()/(53-62) 106/62     Weight: 65.8 kg (145 lb)  Body mass index is 22.71 kg/m².    No intake or output data in the 24 hours ending 02/10/18 1111    Significant Labs:  Lab Results   Component Value Date    GROUPTRH O POS 2018    HEPBSAG Negative 2018    STREPBCULT No Group B Streptococcus isolated 2018       Recent Labs  Lab 18  192   HGB 11.2*   HCT 34.2*     Chlamydia: No results for input(s): LABCHLA in the last 48 hours.  I have personallly reviewed all pertinent lab results from the last 24 hours.

## 2018-02-10 NOTE — LACTATION NOTE
"   02/10/18 1130   Maternal Infant Assessment   Breast Density soft   Areola elastic   Nipple(s) everted   Infant Assessment   Sucking Reflex present   Rooting Reflex present   Swallow Reflex present   LATCH Score   Latch 1-->repeated attempts, holds nipple in mouth, stimulate to suck   Audible Swallowing 1-->a few with stimulation   Type Of Nipple 2-->everted (after stimulation)   Comfort (Breast/Nipple) 2-->soft/nontender   Hold (Positioning) 1-->minimal assist, teach one side: mother does other, staff holds   Score (less than 7 for 2/more consecutive times, consult Lactation Consultant) 7   Maternal Infant Feeding   Maternal Emotional State independent   Infant Positioning clutch/"football";cross-cradle   Latch Assistance yes   Feeding Infant   Effective Latch During Feeding yes   Audible Swallow yes   Suck/Swallow Coordination present   lactation rounds. Discharge instructions reviewed, including contact numbers and available resources. Reports feedings are going well and denies pain. Infant weight and output adequate. Reviewed what to expect as milk is coming in, how to tell baby is getting enough, manual expression of breastmilk, cue based feeding on demand, skin to skin, etc. Encouraged to call with any questions or concerns. Voices understanding.    "

## 2019-09-26 ENCOUNTER — HOSPITAL ENCOUNTER (EMERGENCY)
Facility: OTHER | Age: 29
Discharge: HOME | End: 2019-09-27
Attending: EMERGENCY MEDICINE
Payer: MEDICAID

## 2019-09-26 DIAGNOSIS — R19.7 NAUSEA, VOMITING, AND DIARRHEA: Primary | ICD-10-CM

## 2019-09-26 DIAGNOSIS — R11.2 NAUSEA, VOMITING, AND DIARRHEA: Primary | ICD-10-CM

## 2019-09-26 LAB
ALBUMIN SERPL BCP-MCNC: 4.6 G/DL (ref 3.5–5.2)
ALP SERPL-CCNC: 93 U/L (ref 55–135)
ALT SERPL W/O P-5'-P-CCNC: 52 U/L (ref 10–44)
ANION GAP SERPL CALC-SCNC: 16 MMOL/L (ref 8–16)
AST SERPL-CCNC: 50 U/L (ref 10–40)
BASOPHILS # BLD AUTO: 0.02 K/UL (ref 0–0.2)
BASOPHILS NFR BLD: 0.1 % (ref 0–1.9)
BILIRUB SERPL-MCNC: 0.6 MG/DL (ref 0.1–1)
BUN SERPL-MCNC: 17 MG/DL (ref 6–20)
CALCIUM SERPL-MCNC: 10.3 MG/DL (ref 8.7–10.5)
CHLORIDE SERPL-SCNC: 96 MMOL/L (ref 95–110)
CK SERPL-CCNC: 159 U/L (ref 20–180)
CO2 SERPL-SCNC: 24 MMOL/L (ref 23–29)
CREAT SERPL-MCNC: 0.9 MG/DL (ref 0.5–1.4)
DIFFERENTIAL METHOD: ABNORMAL
EOSINOPHIL # BLD AUTO: 0 K/UL (ref 0–0.5)
EOSINOPHIL NFR BLD: 0 % (ref 0–8)
ERYTHROCYTE [DISTWIDTH] IN BLOOD BY AUTOMATED COUNT: 13.4 % (ref 11.5–14.5)
EST. GFR  (AFRICAN AMERICAN): >60 ML/MIN/1.73 M^2
EST. GFR  (NON AFRICAN AMERICAN): >60 ML/MIN/1.73 M^2
GLUCOSE SERPL-MCNC: 109 MG/DL (ref 70–110)
HCG INTACT+B SERPL-ACNC: <1.2 MIU/ML
HCT VFR BLD AUTO: 45.2 % (ref 37–48.5)
HGB BLD-MCNC: 15 G/DL (ref 12–16)
IMM GRANULOCYTES # BLD AUTO: 0.03 K/UL (ref 0–0.04)
IMM GRANULOCYTES NFR BLD AUTO: 0.2 % (ref 0–0.5)
LIPASE SERPL-CCNC: 9 U/L (ref 4–60)
LYMPHOCYTES # BLD AUTO: 0.9 K/UL (ref 1–4.8)
LYMPHOCYTES NFR BLD: 6.7 % (ref 18–48)
MAGNESIUM SERPL-MCNC: 2.1 MG/DL (ref 1.6–2.6)
MCH RBC QN AUTO: 29.6 PG (ref 27–31)
MCHC RBC AUTO-ENTMCNC: 33.2 G/DL (ref 32–36)
MCV RBC AUTO: 89 FL (ref 82–98)
MONOCYTES # BLD AUTO: 0.9 K/UL (ref 0.3–1)
MONOCYTES NFR BLD: 6.2 % (ref 4–15)
NEUTROPHILS # BLD AUTO: 12 K/UL (ref 1.8–7.7)
NEUTROPHILS NFR BLD: 86.8 % (ref 38–73)
NRBC BLD-RTO: 0 /100 WBC
PLATELET # BLD AUTO: 249 K/UL (ref 150–350)
PMV BLD AUTO: 12.1 FL (ref 9.2–12.9)
POTASSIUM SERPL-SCNC: 3.7 MMOL/L (ref 3.5–5.1)
PROT SERPL-MCNC: 8 G/DL (ref 6–8.4)
RBC # BLD AUTO: 5.07 M/UL (ref 4–5.4)
SODIUM SERPL-SCNC: 136 MMOL/L (ref 136–145)
WBC # BLD AUTO: 13.79 K/UL (ref 3.9–12.7)

## 2019-09-26 PROCEDURE — 96361 HYDRATE IV INFUSION ADD-ON: CPT

## 2019-09-26 PROCEDURE — 80053 COMPREHEN METABOLIC PANEL: CPT

## 2019-09-26 PROCEDURE — 99284 EMERGENCY DEPT VISIT MOD MDM: CPT | Mod: 25

## 2019-09-26 PROCEDURE — 84702 CHORIONIC GONADOTROPIN TEST: CPT

## 2019-09-26 PROCEDURE — 96375 TX/PRO/DX INJ NEW DRUG ADDON: CPT

## 2019-09-26 PROCEDURE — 63600175 PHARM REV CODE 636 W HCPCS: Performed by: EMERGENCY MEDICINE

## 2019-09-26 PROCEDURE — 83690 ASSAY OF LIPASE: CPT

## 2019-09-26 PROCEDURE — 83735 ASSAY OF MAGNESIUM: CPT

## 2019-09-26 PROCEDURE — 96374 THER/PROPH/DIAG INJ IV PUSH: CPT

## 2019-09-26 PROCEDURE — 85025 COMPLETE CBC W/AUTO DIFF WBC: CPT

## 2019-09-26 PROCEDURE — 82550 ASSAY OF CK (CPK): CPT

## 2019-09-26 RX ORDER — METOCLOPRAMIDE HYDROCHLORIDE 5 MG/ML
10 INJECTION INTRAMUSCULAR; INTRAVENOUS
Status: COMPLETED | OUTPATIENT
Start: 2019-09-26 | End: 2019-09-26

## 2019-09-26 RX ORDER — ONDANSETRON 2 MG/ML
8 INJECTION INTRAMUSCULAR; INTRAVENOUS ONCE AS NEEDED
Status: COMPLETED | OUTPATIENT
Start: 2019-09-26 | End: 2019-09-26

## 2019-09-26 RX ORDER — METOCLOPRAMIDE 10 MG/1
10 TABLET ORAL EVERY 6 HOURS PRN
Qty: 10 TABLET | Refills: 0 | Status: SHIPPED | OUTPATIENT
Start: 2019-09-26 | End: 2020-10-08 | Stop reason: SDUPTHER

## 2019-09-26 RX ORDER — DICYCLOMINE HYDROCHLORIDE 20 MG/1
20 TABLET ORAL 3 TIMES DAILY
Qty: 30 TABLET | Refills: 0 | Status: SHIPPED | OUTPATIENT
Start: 2019-09-26 | End: 2019-10-26

## 2019-09-26 RX ORDER — PROMETHAZINE HYDROCHLORIDE 25 MG/1
25 SUPPOSITORY RECTAL EVERY 6 HOURS PRN
Qty: 4 SUPPOSITORY | Refills: 0 | Status: SHIPPED | OUTPATIENT
Start: 2019-09-26

## 2019-09-26 RX ORDER — DICYCLOMINE HYDROCHLORIDE 20 MG/1
20 TABLET ORAL 3 TIMES DAILY
Qty: 30 TABLET | Refills: 0 | Status: SHIPPED | OUTPATIENT
Start: 2019-09-26 | End: 2019-09-26 | Stop reason: SDUPTHER

## 2019-09-26 RX ADMIN — METOCLOPRAMIDE 10 MG: 5 INJECTION, SOLUTION INTRAMUSCULAR; INTRAVENOUS at 10:09

## 2019-09-26 RX ADMIN — ONDANSETRON 8 MG: 2 INJECTION INTRAMUSCULAR; INTRAVENOUS at 11:09

## 2019-09-26 RX ADMIN — SODIUM CHLORIDE 1500 ML: 0.9 INJECTION, SOLUTION INTRAVENOUS at 10:09

## 2019-09-27 VITALS
WEIGHT: 97 LBS | HEIGHT: 67 IN | HEART RATE: 64 BPM | OXYGEN SATURATION: 99 % | SYSTOLIC BLOOD PRESSURE: 110 MMHG | TEMPERATURE: 98 F | BODY MASS INDEX: 15.22 KG/M2 | RESPIRATION RATE: 18 BRPM | DIASTOLIC BLOOD PRESSURE: 68 MMHG

## 2019-09-27 LAB
B-HCG UR QL: NEGATIVE
CTP QC/QA: YES

## 2019-09-27 NOTE — ED NOTES
Pt tolerated  Two 240 oz cups of water with no N/V  At present time, pt is resting comfortable in bed no distress noted.

## 2019-09-27 NOTE — ED PROVIDER NOTES
Encounter Date: 9/26/2019    SCRIBE #1 NOTE: Praful JONES, am scribing for, and in the presence of, Dr. Barth.       History     Chief Complaint   Patient presents with    Abdominal Cramping     abd cramping, nvd, fatigue x2 days.      Time seen by provider: 10:28 PM    This is a 29 y.o. female who presents with complaint of vomiting onset 28:00 (i.e. 26 hours ago). Pt states her daughter and  were sick for several days before with similar symptoms, but there symptoms resolved. She states she began vomiting last night. She woke up from her sleep with nonstop episodes of vomiting then diarrhea began. Pt reports abdominal pain essentially only with vomiting. She states she went to [facility] and was seen in triage and was told she was dehydrated with plans to give her IV fluids and IV medication wrists. She states she waited for 7 hours in the weight room with continued vomiting without being roomed so she left.    The history is provided by the patient and medical records.     Review of patient's allergies indicates:   Allergen Reactions    Pcn [penicillins]      No past medical history on file.  No past surgical history on file.  Family History   Problem Relation Age of Onset    Breast cancer Paternal Grandmother     Colon cancer Paternal Grandmother     Ovarian cancer Paternal Grandmother     Breast cancer Maternal Grandmother     Colon cancer Maternal Grandmother     Ovarian cancer Maternal Grandmother      Social History     Tobacco Use    Smoking status: Never Smoker    Smokeless tobacco: Never Used   Substance Use Topics    Alcohol use: No    Drug use: No     Review of Systems   ROS: As per HPI and below:   General: No fever.   HENT: No facial pain.   Eyes: No eye pain.   Cardiovascular: No chest pain.   Respiratory: No dyspnea.   GI: Positive abdominal pain. Positive vomiting. Positive nausea. Positive abdominal pain.  Positive for diarrhea  Skin: No rashes.  Neuro: No syncope.   "  Musculoskeletal: No extremity pain. No swelling.    All other systems negative.         Physical Exam     Initial Vitals [09/26/19 2016]   BP Pulse Resp Temp SpO2   (!) 138/97 82 17 98.1 °F (36.7 °C) 100 %      MAP       --         Physical Exam   Nursing note and vitals reviewed.  BP (!) 138/97 (BP Location: Right arm, Patient Position: Sitting)   Pulse 82   Temp 98.1 °F (36.7 °C) (Oral)   Resp 17   Ht 5' 7" (1.702 m)   Wt 44 kg (97 lb)   SpO2 100%   BMI 15.19 kg/m²   Constitutional: AAOx3. No distress. Uncomfortable and tired appearance. Emesis bag in hand. Very thin framed.  Eyes: EOMI. No discharge. Anicteric.  HENT:   Mouth/Throat: Oropharynx is clear. Uvula midline. Mucus membranes dry.  Neck: Normal range of motion. Neck supple.  Cardiovascular: Normal rate. No murmur, no gallop and no friction rub heard.   Pulmonary/Chest: No respiratory distress. Effort normal. No wheezes, no rales, no rhonchi.   Abdominal: Bowel sounds normal. Soft. No distension and no mass. There is no tenderness. There is no rebound, no guarding, no tenderness at McBurney's point.  Musculoskeletal: Normal range of motion.   Neurological: GCS 15. Alert and oriented to person, place, and time. No gross cranial nerve, light touch or strength deficit. Coordination normal.   Skin: Skin is warm and dry.   Psychiatric: Behavior is normal. Judgment normal.      ED Course   Procedures  Labs Reviewed   URINALYSIS, REFLEX TO URINE CULTURE   POCT URINE PREGNANCY          Imaging Results    None                     Scribe Attestation:   Scribe #1: I performed the above scribed service and the documentation accurately describes the services I performed. I attest to the accuracy of the note.    Attending Attestation:           Physician Attestation for Scribe:  Physician Attestation Statement for Scribe #1: I, Dr. Barth , reviewed documentation, as scribed by Praful Odell in my presence, and it is both accurate and complete.               "   ED Course as of Sep 27 2100   Thu Sep 26, 2019   2258 Patient is a 29-year-old female with no reported past medical history who presents with severe nausea, vomiting, abdominal cramping, diarrhea for about the last 26 hr.  Patient's  and child have had the same symptoms, but there is are nearly resolved.  Apart from feeling hot while in the waiting room here, she denies any subjective or objective fevers.  On exam patient with very dry mucous membranes, ill appearance, no abdominal tenderness.   The initial differential included acute viral gastroenteritis, severe dehydration, rhabdomyolysis, colitis.    [RC]   2321 I independently reviewed and interpreted labs which are notable for minimal leukocytosis, otherwise unremarkable.      [RC]   2349 Patient resolution of her symptoms. She tolerated oral intake.  We will discharge her pending she is able to void.  I discussed with patient and/or guardian/caretaker that this evaluation in the ED does not suggest any emergent or life threatening medical condition requiring admission or immediate intervention beyond what was provided in the ED. Regardless, an unremarkable evaluation in the ED does not preclude the development or presence of a serious or life threatening condition. As such, patient was instructed to return immediately for any worsening or change in current symptoms.     I had a detailed discussion with patient  and/or guardian/caretaker regarding findings, plan, return precautions, importance of medication adherence, need to follow-up with a PCP. All questions answered.     Note was created using voice recognition software. It may have occasional typographical errors not identified and edited despite initial review prior to signing.        [RC]      ED Course User Index  [RC] Yomi Barth MD     Clinical Impression:     1. Nausea, vomiting, and diarrhea                                   Yomi Barth MD  09/27/19 2415

## 2019-09-27 NOTE — DISCHARGE INSTRUCTIONS
Call your primary care doctor to make the first available appointment.     Keep all your medical appointments.     Take your regular medication as prescribed. Contact your primary care provider before running out of medication, or for any problems obtaining them.    Do not drive or operate heavy machinery while taking opioid or muscle relaxing medications. Examples include norco, percocet, xanax, valium, flexeril.     Overuse or long term use of pain and sedating medication may lead to addiction, dependence, organ failure, family and work problems, legal problems, accidental overdose and death.    If you do not have health insurance, you probably qualify for heavily discounted rates:  Call [phone] ([facility] hotline) or go to www[place].gov    Your evaluation in the ED does not suggest any emergent or life threatening medical condition requiring admission or immediate intervention beyond that provided in the ED.   However, the signs of a serious problem sometimes take more time to appear.   RETURN TO THE ER if any of the following occur:    Weakness, dizziness, fainting, or loss of consciousness   Fever of 100.4ºF (38ºC) or higher  Any worse symptoms  Any new or concerning symptoms

## 2019-09-27 NOTE — ED TRIAGE NOTES
Pt arrives to Ed with c/o intermittent abdominal pain. Pt reports family being sick as well. Pt reports nausea and vomiting, sharp cramping. Pt reports taking Zofran with no relief of symptoms, reports taking pain medication with no relief of pain. Pt lying in bed, respirations even, unlabored, eyes open spontaneously, NAD noted, AAOx4, answering questions approprietly.

## 2020-10-08 ENCOUNTER — HOSPITAL ENCOUNTER (EMERGENCY)
Facility: OTHER | Age: 30
Discharge: HOME | End: 2020-10-08
Attending: EMERGENCY MEDICINE

## 2020-10-08 VITALS
RESPIRATION RATE: 18 BRPM | HEIGHT: 67 IN | OXYGEN SATURATION: 100 % | TEMPERATURE: 98 F | SYSTOLIC BLOOD PRESSURE: 100 MMHG | BODY MASS INDEX: 15.7 KG/M2 | HEART RATE: 73 BPM | WEIGHT: 100 LBS | DIASTOLIC BLOOD PRESSURE: 59 MMHG

## 2020-10-08 DIAGNOSIS — R11.2 NON-INTRACTABLE VOMITING WITH NAUSEA, UNSPECIFIED VOMITING TYPE: Primary | ICD-10-CM

## 2020-10-08 DIAGNOSIS — Z98.890 HISTORY OF MANDIBULAR SURGERY: ICD-10-CM

## 2020-10-08 LAB
ALBUMIN SERPL BCP-MCNC: 4.9 G/DL (ref 3.5–5.2)
ALP SERPL-CCNC: 87 U/L (ref 55–135)
ALT SERPL W/O P-5'-P-CCNC: 38 U/L (ref 10–44)
ANION GAP SERPL CALC-SCNC: 19 MMOL/L (ref 8–16)
AST SERPL-CCNC: 42 U/L (ref 10–40)
B-HCG UR QL: NEGATIVE
BACTERIA #/AREA URNS HPF: ABNORMAL /HPF
BASOPHILS # BLD AUTO: 0.02 K/UL (ref 0–0.2)
BASOPHILS NFR BLD: 0.1 % (ref 0–1.9)
BILIRUB SERPL-MCNC: 0.7 MG/DL (ref 0.1–1)
BILIRUB UR QL STRIP: NEGATIVE
BUN SERPL-MCNC: 12 MG/DL (ref 6–20)
CALCIUM SERPL-MCNC: 9.8 MG/DL (ref 8.7–10.5)
CHLORIDE SERPL-SCNC: 98 MMOL/L (ref 95–110)
CLARITY UR: ABNORMAL
CO2 SERPL-SCNC: 20 MMOL/L (ref 23–29)
COLOR UR: YELLOW
CREAT SERPL-MCNC: 0.8 MG/DL (ref 0.5–1.4)
CTP QC/QA: YES
DIFFERENTIAL METHOD: ABNORMAL
EOSINOPHIL # BLD AUTO: 0 K/UL (ref 0–0.5)
EOSINOPHIL NFR BLD: 0 % (ref 0–8)
ERYTHROCYTE [DISTWIDTH] IN BLOOD BY AUTOMATED COUNT: 13.4 % (ref 11.5–14.5)
EST. GFR  (AFRICAN AMERICAN): >60 ML/MIN/1.73 M^2
EST. GFR  (NON AFRICAN AMERICAN): >60 ML/MIN/1.73 M^2
GLUCOSE SERPL-MCNC: 108 MG/DL (ref 70–110)
GLUCOSE UR QL STRIP: NEGATIVE
HCT VFR BLD AUTO: 43.1 % (ref 37–48.5)
HCV AB SERPL QL IA: NEGATIVE
HGB BLD-MCNC: 14.4 G/DL (ref 12–16)
HGB UR QL STRIP: ABNORMAL
HIV 1+2 AB+HIV1 P24 AG SERPL QL IA: NEGATIVE
HYALINE CASTS #/AREA URNS LPF: 0 /LPF
IMM GRANULOCYTES # BLD AUTO: 0.04 K/UL (ref 0–0.04)
IMM GRANULOCYTES NFR BLD AUTO: 0.3 % (ref 0–0.5)
KETONES UR QL STRIP: ABNORMAL
LEUKOCYTE ESTERASE UR QL STRIP: NEGATIVE
LIPASE SERPL-CCNC: 8 U/L (ref 4–60)
LYMPHOCYTES # BLD AUTO: 0.5 K/UL (ref 1–4.8)
LYMPHOCYTES NFR BLD: 3.7 % (ref 18–48)
MCH RBC QN AUTO: 30.2 PG (ref 27–31)
MCHC RBC AUTO-ENTMCNC: 33.4 G/DL (ref 32–36)
MCV RBC AUTO: 90 FL (ref 82–98)
MICROSCOPIC COMMENT: ABNORMAL
MONOCYTES # BLD AUTO: 0.4 K/UL (ref 0.3–1)
MONOCYTES NFR BLD: 2.4 % (ref 4–15)
NEUTROPHILS # BLD AUTO: 13.5 K/UL (ref 1.8–7.7)
NEUTROPHILS NFR BLD: 93.5 % (ref 38–73)
NITRITE UR QL STRIP: NEGATIVE
NRBC BLD-RTO: 0 /100 WBC
PH UR STRIP: 6 [PH] (ref 5–8)
PLATELET # BLD AUTO: 223 K/UL (ref 150–350)
PMV BLD AUTO: 13 FL (ref 9.2–12.9)
POTASSIUM SERPL-SCNC: 3.4 MMOL/L (ref 3.5–5.1)
PROT SERPL-MCNC: 7.8 G/DL (ref 6–8.4)
PROT UR QL STRIP: ABNORMAL
RBC # BLD AUTO: 4.77 M/UL (ref 4–5.4)
RBC #/AREA URNS HPF: 2 /HPF (ref 0–4)
SODIUM SERPL-SCNC: 137 MMOL/L (ref 136–145)
SP GR UR STRIP: >=1.03 (ref 1–1.03)
SQUAMOUS #/AREA URNS HPF: 40 /HPF
URN SPEC COLLECT METH UR: ABNORMAL
UROBILINOGEN UR STRIP-ACNC: NEGATIVE EU/DL
WBC # BLD AUTO: 14.42 K/UL (ref 3.9–12.7)
WBC #/AREA URNS HPF: 6 /HPF (ref 0–5)

## 2020-10-08 PROCEDURE — 81000 URINALYSIS NONAUTO W/SCOPE: CPT

## 2020-10-08 PROCEDURE — 80053 COMPREHEN METABOLIC PANEL: CPT

## 2020-10-08 PROCEDURE — 99284 EMERGENCY DEPT VISIT MOD MDM: CPT | Mod: 25

## 2020-10-08 PROCEDURE — 25000003 PHARM REV CODE 250: Performed by: EMERGENCY MEDICINE

## 2020-10-08 PROCEDURE — 96375 TX/PRO/DX INJ NEW DRUG ADDON: CPT

## 2020-10-08 PROCEDURE — 96361 HYDRATE IV INFUSION ADD-ON: CPT

## 2020-10-08 PROCEDURE — 83690 ASSAY OF LIPASE: CPT

## 2020-10-08 PROCEDURE — 81025 URINE PREGNANCY TEST: CPT | Performed by: EMERGENCY MEDICINE

## 2020-10-08 PROCEDURE — 96374 THER/PROPH/DIAG INJ IV PUSH: CPT

## 2020-10-08 PROCEDURE — 86803 HEPATITIS C AB TEST: CPT

## 2020-10-08 PROCEDURE — 85025 COMPLETE CBC W/AUTO DIFF WBC: CPT

## 2020-10-08 PROCEDURE — 86703 HIV-1/HIV-2 1 RESULT ANTBDY: CPT

## 2020-10-08 PROCEDURE — 63600175 PHARM REV CODE 636 W HCPCS: Performed by: EMERGENCY MEDICINE

## 2020-10-08 RX ORDER — METOCLOPRAMIDE 10 MG/1
10 TABLET ORAL EVERY 6 HOURS PRN
Qty: 20 TABLET | Refills: 0 | Status: SHIPPED | OUTPATIENT
Start: 2020-10-08

## 2020-10-08 RX ORDER — METOCLOPRAMIDE HYDROCHLORIDE 5 MG/ML
10 INJECTION INTRAMUSCULAR; INTRAVENOUS
Status: COMPLETED | OUTPATIENT
Start: 2020-10-08 | End: 2020-10-08

## 2020-10-08 RX ORDER — DIPHENHYDRAMINE HYDROCHLORIDE 50 MG/ML
25 INJECTION INTRAMUSCULAR; INTRAVENOUS
Status: COMPLETED | OUTPATIENT
Start: 2020-10-08 | End: 2020-10-08

## 2020-10-08 RX ORDER — ONDANSETRON 2 MG/ML
4 INJECTION INTRAMUSCULAR; INTRAVENOUS
Status: COMPLETED | OUTPATIENT
Start: 2020-10-08 | End: 2020-10-08

## 2020-10-08 RX ADMIN — ONDANSETRON 4 MG: 2 INJECTION INTRAMUSCULAR; INTRAVENOUS at 10:10

## 2020-10-08 RX ADMIN — METOCLOPRAMIDE 10 MG: 5 INJECTION, SOLUTION INTRAMUSCULAR; INTRAVENOUS at 12:10

## 2020-10-08 RX ADMIN — SODIUM CHLORIDE 1000 ML: 0.9 INJECTION, SOLUTION INTRAVENOUS at 10:10

## 2020-10-08 RX ADMIN — SODIUM CHLORIDE 1000 ML: 0.9 INJECTION, SOLUTION INTRAVENOUS at 02:10

## 2020-10-08 RX ADMIN — DIPHENHYDRAMINE HYDROCHLORIDE 25 MG: 50 INJECTION, SOLUTION INTRAMUSCULAR; INTRAVENOUS at 12:10

## 2020-10-08 NOTE — ED TRIAGE NOTES
Pt presents to the ED for c/o abdominal pain with nausea and vomiting for the past 2 days. Pt states they have a history with this, but it has been awhile. Pt denies marijuana use, but took a pain pill that they believe made it worse. Pt had recent jaw surgery and states their jaw hurts from throwing up. Pt would like to have it checked out. ENEIDA. AAOx4

## 2020-10-08 NOTE — ED PROVIDER NOTES
Encounter Date: 10/8/2020    SCRIBE #1 NOTE: I, Nemo Mart, am scribing for, and in the presence of, Dr. Lunsford.       History     Chief Complaint   Patient presents with    GI Problem     n/v x 1 day. reports hx of GI issues with vomiting not stopping once starting.      Time seen by provider: 10:10 AM    This is a 30 y.o. female who presents with complaint of N/V that began after eating pizza 17 hours ago. She reports vomiting every 15 to 30 minutes. Vomitus is greenish yellow in color. She is unable to tolerate solids or liquids. She reports generalized abdominal pain. Last bowel movement was this morning, no diarrhea or blood. She reports chills and subjective fever. Her family ate the same pizza that she did and have not had similar symptoms. She has had similar episodes in the past, though has not had any episodes since her last pregnancy. She took Zofran yesterday with no relief, which has intermittently provided relief in the past. She also has had relief with phenergan in the past, but has not taken it for this episode. She notes feeling anxious and took a leftover Rx to help her sleep last night.  No hx of NSAID use. She states she stopped smoking marijuana daily and has not noticed a change in frequency of vomiting episodes. She denies drinking alcohol. This is the extent of the patient's complaints at this time.     The history is provided by the patient.     Review of patient's allergies indicates:   Allergen Reactions    Pcn [penicillins]      No past medical history on file.  No past surgical history on file.  Family History   Problem Relation Age of Onset    Breast cancer Paternal Grandmother     Colon cancer Paternal Grandmother     Ovarian cancer Paternal Grandmother     Breast cancer Maternal Grandmother     Colon cancer Maternal Grandmother     Ovarian cancer Maternal Grandmother      Social History     Tobacco Use    Smoking status: Never Smoker    Smokeless tobacco: Never Used    Substance Use Topics    Alcohol use: No    Drug use: No     Review of Systems   Constitutional: Positive for chills and fever.   Eyes: Negative for visual disturbance.   Respiratory: Negative for shortness of breath.    Cardiovascular: Negative for chest pain.   Gastrointestinal: Positive for abdominal pain, nausea and vomiting. Negative for blood in stool, constipation and diarrhea.   Genitourinary: Negative for dysuria.   Musculoskeletal: Negative for back pain.   Skin: Negative for rash.   Neurological: Negative for weakness.   Psychiatric/Behavioral: The patient is nervous/anxious.        Physical Exam     Initial Vitals [10/08/20 0936]   BP Pulse Resp Temp SpO2   (!) 105/53 62 18 97.7 °F (36.5 °C) 100 %      MAP       --         Physical Exam    Nursing note and vitals reviewed.  Constitutional: She appears well-developed and well-nourished. She is not diaphoretic. No distress.   HENT:   Head: Normocephalic and atraumatic.   Mouth/Throat: Mucous membranes are dry.   Eyes: EOM are normal. Pupils are equal, round, and reactive to light.   Neck: Normal range of motion. Neck supple.   Cardiovascular: Normal rate, regular rhythm, normal heart sounds and intact distal pulses.   No murmur heard.  Pulmonary/Chest: Breath sounds normal. No respiratory distress. She has no wheezes. She has no rhonchi. She has no rales.   Abdominal: Soft. Bowel sounds are normal. She exhibits no distension. There is no abdominal tenderness. There is no rebound and no guarding.   No focal abdominal tenderness.   Musculoskeletal: Normal range of motion. No edema.   Neurological: She is alert and oriented to person, place, and time. She has normal strength. No cranial nerve deficit.   Skin: Skin is warm and dry.   Psychiatric: Her mood appears anxious.         ED Course   Procedures  Labs Reviewed   CBC W/ AUTO DIFFERENTIAL - Abnormal; Notable for the following components:       Result Value    WBC 14.42 (*)     MPV 13.0 (*)     Gran #  (ANC) 13.5 (*)     Lymph # 0.5 (*)     Gran% 93.5 (*)     Lymph% 3.7 (*)     Mono% 2.4 (*)     All other components within normal limits   COMPREHENSIVE METABOLIC PANEL - Abnormal; Notable for the following components:    Potassium 3.4 (*)     CO2 20 (*)     AST 42 (*)     Anion Gap 19 (*)     All other components within normal limits   URINALYSIS, REFLEX TO URINE CULTURE - Abnormal; Notable for the following components:    Appearance, UA Hazy (*)     Specific Gravity, UA >=1.030 (*)     Protein, UA 1+ (*)     Ketones, UA 3+ (*)     Occult Blood UA Trace (*)     All other components within normal limits    Narrative:     Specimen Source->Urine   URINALYSIS MICROSCOPIC - Abnormal; Notable for the following components:    WBC, UA 6 (*)     All other components within normal limits    Narrative:     Specimen Source->Urine   HIV 1 / 2 ANTIBODY   HEPATITIS C ANTIBODY   LIPASE   POCT URINE PREGNANCY               Medical Decision Making:   History:   Old Medical Records: I decided to obtain old medical records.  Initial Assessment:       30-year-old female presents for evaluation of persistent nausea and vomiting that started yesterday.  Prior to onset she has some pizza, but her family ate the same thing with no similar symptoms.  She has had frequent episodes of emesis in the past, which she related to increased stress and anxiety, but states that since her last pregnancy in 2018 these episodes have not been as frequent.  She tried taking Zofran at home with no improvement.  She notes mild generalized abdominal pain that started after multiple episodes of emesis.  No fevers, constipation/diarrhea, dysuria, blood in emesis, or other associated symptoms.    On arrival patient actively vomiting but she is afebrile with otherwise normal vitals.  She has no focal abdominal tenderness or other concerning exam findings.    Differential includes gastritis, anxiety reaction, cyclical vomiting syndrome; will check labs to ensure  no electrolyte abnormality or GARETT. She previously smoked THC regularly, but on states she uses infrequently now with no change in vomiting frequency.    Labs with slightly elevated WBC to 14, though this is similar to previous WBC levels when previously seen for emesis.  CMP with K 3.4 and elevated anion gap; no history of diabetes, this is more likely due to starvation ketoacidosis.  No significantly elevated LFTs or lipase.  UA with ketones and 6 WBC, though negative leuks and nitrates and contaminated sample, no symptoms to suggest true UTI.   After IV Zofran patient no longer had emesis but still felt some nausea.  She was then treated with Reglan/Benadryl, and is now drinking fluids and requesting food.  Still no abdominal tenderness on reassessment, no indication for emergent imaging or sign of intra-abdominal infection.  She was treated with 2 L IVF for ketoacidosis, and vitals remained stable.  Patient then requested mandible x-ray since she has history of mandible fracture repair and some minor trauma to that area a few days ago with persistent pain.  I ordered an mandible x-ray to further evaluate but she then requested discharge since she wanted food better than the sandwiches we offered her.   Will discharge with Reglan p.r.n., bland diet advanced as tolerated, and return precautions for any recurrent persistent vomiting or other concerns.        Clinical Tests:   Lab Tests: Ordered and Reviewed            Scribe Attestation:   Scribe #1: I performed the above scribed service and the documentation accurately describes the services I performed. I attest to the accuracy of the note.    Attending Attestation:           Physician Attestation for Scribe:  Physician Attestation Statement for Scribe #1: I, Dr. Lunsford, reviewed documentation, as scribed by Nemo Mart in my presence, and it is both accurate and complete.                           Clinical Impression:     ICD-10-CM ICD-9-CM   1.  Non-intractable vomiting with nausea, unspecified vomiting type  R11.2 787.01   2. History of mandibular surgery  Z98.890 V45.89                          ED Disposition Condition    Discharge Stable        ED Prescriptions     Medication Sig Dispense Start Date End Date Auth. Provider    metoclopramide HCl (REGLAN) 10 MG tablet Take 1 tablet (10 mg total) by mouth every 6 (six) hours as needed (headache, nausea or vomiting). 20 tablet 10/8/2020  Derrick Lunsford MD        Follow-up Information     Follow up With Specialties Details Why Contact Info    [facility] Go to  If symptoms worsen [address] [id number]  [phone]                                       Derrick Lunsford MD  10/10/20 0870

## 2020-10-10 ENCOUNTER — HOSPITAL ENCOUNTER (EMERGENCY)
Facility: OTHER | Age: 30
Discharge: HOME | End: 2020-10-10
Attending: EMERGENCY MEDICINE

## 2020-10-10 VITALS
TEMPERATURE: 98 F | BODY MASS INDEX: 15.7 KG/M2 | DIASTOLIC BLOOD PRESSURE: 61 MMHG | OXYGEN SATURATION: 100 % | WEIGHT: 100 LBS | HEIGHT: 67 IN | SYSTOLIC BLOOD PRESSURE: 115 MMHG | RESPIRATION RATE: 17 BRPM | HEART RATE: 56 BPM

## 2020-10-10 DIAGNOSIS — R19.7 VOMITING AND DIARRHEA: Primary | ICD-10-CM

## 2020-10-10 DIAGNOSIS — R11.10 VOMITING: ICD-10-CM

## 2020-10-10 DIAGNOSIS — R11.10 VOMITING AND DIARRHEA: Primary | ICD-10-CM

## 2020-10-10 LAB
ALBUMIN SERPL BCP-MCNC: 4.3 G/DL (ref 3.5–5.2)
ALP SERPL-CCNC: 69 U/L (ref 55–135)
ALT SERPL W/O P-5'-P-CCNC: 32 U/L (ref 10–44)
ANION GAP SERPL CALC-SCNC: 17 MMOL/L (ref 8–16)
AST SERPL-CCNC: 29 U/L (ref 10–40)
B-HCG UR QL: NEGATIVE
B-OH-BUTYR BLD STRIP-SCNC: 1.7 MMOL/L (ref 0–0.5)
BASOPHILS # BLD AUTO: 0.04 K/UL (ref 0–0.2)
BASOPHILS NFR BLD: 0.4 % (ref 0–1.9)
BILIRUB SERPL-MCNC: 0.7 MG/DL (ref 0.1–1)
BILIRUB UR QL STRIP: NEGATIVE
BUN SERPL-MCNC: 8 MG/DL (ref 6–20)
CALCIUM SERPL-MCNC: 9.1 MG/DL (ref 8.7–10.5)
CHLORIDE SERPL-SCNC: 99 MMOL/L (ref 95–110)
CLARITY UR: CLEAR
CO2 SERPL-SCNC: 21 MMOL/L (ref 23–29)
COLOR UR: YELLOW
CREAT SERPL-MCNC: 0.8 MG/DL (ref 0.5–1.4)
CTP QC/QA: YES
DIFFERENTIAL METHOD: ABNORMAL
EOSINOPHIL # BLD AUTO: 0 K/UL (ref 0–0.5)
EOSINOPHIL NFR BLD: 0 % (ref 0–8)
ERYTHROCYTE [DISTWIDTH] IN BLOOD BY AUTOMATED COUNT: 13.2 % (ref 11.5–14.5)
EST. GFR  (AFRICAN AMERICAN): >60 ML/MIN/1.73 M^2
EST. GFR  (NON AFRICAN AMERICAN): >60 ML/MIN/1.73 M^2
GLUCOSE SERPL-MCNC: 78 MG/DL (ref 70–110)
GLUCOSE UR QL STRIP: NEGATIVE
HCT VFR BLD AUTO: 45.7 % (ref 37–48.5)
HGB BLD-MCNC: 15.1 G/DL (ref 12–16)
HGB UR QL STRIP: NEGATIVE
IMM GRANULOCYTES # BLD AUTO: 0.02 K/UL (ref 0–0.04)
IMM GRANULOCYTES NFR BLD AUTO: 0.2 % (ref 0–0.5)
KETONES UR QL STRIP: ABNORMAL
LEUKOCYTE ESTERASE UR QL STRIP: NEGATIVE
LIPASE SERPL-CCNC: 75 U/L (ref 4–60)
LYMPHOCYTES # BLD AUTO: 1.4 K/UL (ref 1–4.8)
LYMPHOCYTES NFR BLD: 14 % (ref 18–48)
MCH RBC QN AUTO: 29.5 PG (ref 27–31)
MCHC RBC AUTO-ENTMCNC: 33 G/DL (ref 32–36)
MCV RBC AUTO: 89 FL (ref 82–98)
MONOCYTES # BLD AUTO: 0.7 K/UL (ref 0.3–1)
MONOCYTES NFR BLD: 7.4 % (ref 4–15)
NEUTROPHILS # BLD AUTO: 7.8 K/UL (ref 1.8–7.7)
NEUTROPHILS NFR BLD: 78 % (ref 38–73)
NITRITE UR QL STRIP: NEGATIVE
NRBC BLD-RTO: 0 /100 WBC
PH UR STRIP: 8 [PH] (ref 5–8)
PLATELET # BLD AUTO: 213 K/UL (ref 150–350)
PMV BLD AUTO: 12.2 FL (ref 9.2–12.9)
POTASSIUM SERPL-SCNC: 3 MMOL/L (ref 3.5–5.1)
PROT SERPL-MCNC: 7.1 G/DL (ref 6–8.4)
PROT UR QL STRIP: NEGATIVE
RBC # BLD AUTO: 5.12 M/UL (ref 4–5.4)
SODIUM SERPL-SCNC: 137 MMOL/L (ref 136–145)
SP GR UR STRIP: 1.01 (ref 1–1.03)
URN SPEC COLLECT METH UR: ABNORMAL
UROBILINOGEN UR STRIP-ACNC: NEGATIVE EU/DL
WBC # BLD AUTO: 9.94 K/UL (ref 3.9–12.7)

## 2020-10-10 PROCEDURE — 93005 ELECTROCARDIOGRAM TRACING: CPT

## 2020-10-10 PROCEDURE — 63600175 PHARM REV CODE 636 W HCPCS: Performed by: EMERGENCY MEDICINE

## 2020-10-10 PROCEDURE — 81003 URINALYSIS AUTO W/O SCOPE: CPT

## 2020-10-10 PROCEDURE — 83690 ASSAY OF LIPASE: CPT

## 2020-10-10 PROCEDURE — 93010 EKG 12-LEAD: ICD-10-PCS | Mod: ,,, | Performed by: INTERNAL MEDICINE

## 2020-10-10 PROCEDURE — 80053 COMPREHEN METABOLIC PANEL: CPT

## 2020-10-10 PROCEDURE — 85025 COMPLETE CBC W/AUTO DIFF WBC: CPT

## 2020-10-10 PROCEDURE — 25000003 PHARM REV CODE 250: Performed by: EMERGENCY MEDICINE

## 2020-10-10 PROCEDURE — 82010 KETONE BODYS QUAN: CPT

## 2020-10-10 PROCEDURE — 81025 URINE PREGNANCY TEST: CPT | Performed by: EMERGENCY MEDICINE

## 2020-10-10 PROCEDURE — 96375 TX/PRO/DX INJ NEW DRUG ADDON: CPT

## 2020-10-10 PROCEDURE — 99284 EMERGENCY DEPT VISIT MOD MDM: CPT | Mod: 25

## 2020-10-10 PROCEDURE — 93010 ELECTROCARDIOGRAM REPORT: CPT | Mod: ,,, | Performed by: INTERNAL MEDICINE

## 2020-10-10 PROCEDURE — 96365 THER/PROPH/DIAG IV INF INIT: CPT

## 2020-10-10 RX ORDER — DEXTROSE MONOHYDRATE, SODIUM CHLORIDE, AND POTASSIUM CHLORIDE 50; 2.98; 4.5 G/1000ML; G/1000ML; G/1000ML
1000 INJECTION, SOLUTION INTRAVENOUS
Status: COMPLETED | OUTPATIENT
Start: 2020-10-10 | End: 2020-10-10

## 2020-10-10 RX ORDER — HALOPERIDOL 5 MG/ML
5 INJECTION INTRAMUSCULAR
Status: COMPLETED | OUTPATIENT
Start: 2020-10-10 | End: 2020-10-10

## 2020-10-10 RX ADMIN — DEXTROSE MONOHYDRATE, SODIUM CHLORIDE, AND POTASSIUM CHLORIDE 1000 ML: 50; 4.5; 2.98 INJECTION, SOLUTION INTRAVENOUS at 05:10

## 2020-10-10 RX ADMIN — SODIUM CHLORIDE 1000 ML: 0.9 INJECTION, SOLUTION INTRAVENOUS at 04:10

## 2020-10-10 RX ADMIN — HALOPERIDOL LACTATE 5 MG: 5 INJECTION, SOLUTION INTRAMUSCULAR at 04:10

## 2020-10-10 RX ADMIN — PROMETHAZINE HYDROCHLORIDE 12.5 MG: 25 INJECTION INTRAMUSCULAR; INTRAVENOUS at 04:10

## 2020-10-10 NOTE — ED PROVIDER NOTES
Encounter Date: 10/10/2020    SCRIBE #1 NOTE: I, Brittany Mitchell, am scribing for, and in the presence of, Dr. Lyon.       History     Chief Complaint   Patient presents with    Emesis     Pt reports vomiting since last night. Pt was seen two days ago for the same s/s. She reports last marijuana use was two days ago.      Time seen by provider: 3:55 PM    This is a 30 y.o. female who presents with complaint of persistent N/V with associated abdominal pain for the past 3 days, now with diarrhea since yesterday morning and hot-to-cold flashes today. She is still unable to tolerate solids and liquids after being seen here 2 days ago for the same symptoms. She denies any marijuana use since this visit. She has not filled prescription for Reglan yet. She has tried Zofran and pain medication from previous surgery without relief. She's had previous episodes like this but states that they have been much less frequent since her last pregnancy in 2018. She reports a history of anxiety and feels that this has exacerbated her symptoms. She denies any recent dysuria, urinary frequency, urinary urgency, blood in vomit, or blood in stool. She denies any previous abdominal surgeries. She denies additional complaints at this time.    The history is provided by the patient.     Review of patient's allergies indicates:   Allergen Reactions    Pcn [penicillins]      History reviewed. No pertinent past medical history.  Past Surgical History:   Procedure Laterality Date    MANDIBLE SURGERY       Family History   Problem Relation Age of Onset    Breast cancer Paternal Grandmother     Colon cancer Paternal Grandmother     Ovarian cancer Paternal Grandmother     Breast cancer Maternal Grandmother     Colon cancer Maternal Grandmother     Ovarian cancer Maternal Grandmother      Social History     Tobacco Use    Smoking status: Never Smoker    Smokeless tobacco: Never Used   Substance Use Topics    Alcohol use: Yes     Comment:  socially    Drug use: Yes     Types: Marijuana     Review of Systems   Constitutional: Negative for fever.        Positive for hot-to-cold flashes.   HENT: Negative for sore throat.    Respiratory: Negative for shortness of breath.    Cardiovascular: Negative for chest pain.   Gastrointestinal: Positive for abdominal pain, diarrhea, nausea and vomiting. Negative for blood in stool.   Genitourinary: Negative for dysuria, frequency and urgency.   Musculoskeletal: Negative for back pain.   Skin: Negative for rash.   Neurological: Negative for dizziness, weakness and headaches.   Psychiatric/Behavioral: Negative for confusion.       Physical Exam     Initial Vitals [10/10/20 1449]   BP Pulse Resp Temp SpO2   114/66 66 18 98 °F (36.7 °C) 100 %      MAP       --         Physical Exam    Nursing note and vitals reviewed.  Constitutional: She appears well-developed and well-nourished.   HENT:   Head: Normocephalic and atraumatic.   Eyes: EOM are normal.   Neck: Normal range of motion.   Cardiovascular: Normal rate, regular rhythm and normal heart sounds.   Pulmonary/Chest: Breath sounds normal. No respiratory distress. She has no wheezes. She has no rales.   Abdominal: Soft. Bowel sounds are normal. She exhibits no distension. There is no abdominal tenderness.   Musculoskeletal: Normal range of motion.   Neurological: She is alert and oriented to person, place, and time.   Ambulatory with a steady gait.   Skin: Skin is warm and dry. Capillary refill takes less than 2 seconds. No rash noted.   Psychiatric: Judgment and thought content normal.         ED Course   Procedures  Labs Reviewed   COMPREHENSIVE METABOLIC PANEL - Abnormal; Notable for the following components:       Result Value    Potassium 3.0 (*)     CO2 21 (*)     Anion Gap 17 (*)     All other components within normal limits   CBC W/ AUTO DIFFERENTIAL - Abnormal; Notable for the following components:    Gran # (ANC) 7.8 (*)     Gran% 78.0 (*)     Lymph% 14.0  (*)     All other components within normal limits   LIPASE - Abnormal; Notable for the following components:    Lipase 75 (*)     All other components within normal limits   BETA - HYDROXYBUTYRATE, SERUM - Abnormal; Notable for the following components:    Beta-Hydroxybutyrate 1.7 (*)     All other components within normal limits   URINALYSIS - Abnormal; Notable for the following components:    Ketones, UA 3+ (*)     All other components within normal limits   POCT URINE PREGNANCY     EKG Readings: (Independently Interpreted)   4:27PM:  Rate of 57. Sinus bradycardia. Normal axis. Normal intervals.  No ST or ischemic changes.         Imaging Results    None          Medical Decision Making:   History:   Old Medical Records: I decided to obtain old medical records.  Old Records Summarized: other records and records from clinic visits.  Initial Assessment:   3:55PM:  Patient is a 30-year-old female who presents to the emergency room with vomiting and diarrhea, and abdominal pain.  Patient appears well, nontoxic.  She has had multiple visits in the past for similar symptoms, including earlier this week.  Patient's symptoms have been attributed to marijuana in the past.  Her abdomen is very soft, nontender.   Will plan for labs, antiemetics, fluids, will continue to follow and reassess.    Independently Interpreted Test(s):   I have ordered and independently interpreted EKG Reading(s) - see prior notes  Clinical Tests:   Lab Tests: Ordered and Reviewed  Medical Tests: Ordered and Reviewed    6:30 PM:  Pt doing well, feeling better, requesting to go home.  Her labs do show some dehydration with a mildly elevated lipase but no other acute findings.  She has received fluids and potassium supplementation/  I do not feel that further work up in the ED is indicated at this time.  I updated pt regarding results and I counseled pt regarding supportive care measures.  I have discussed with the pt ED return warnings and need for  close PCP f/u.  Pt agreeable to plan and all questions answered.  I feel that pt is stable for discharge and management as an outpatient and no further intervention is needed at this time.  Pt is comfortable returning to the ED if needed.  Will DC home in stable condition.                Scribe Attestation:   Scribe #1: I performed the above scribed service and the documentation accurately describes the services I performed. I attest to the accuracy of the note.    Attending Attestation:           Physician Attestation for Scribe:  Physician Attestation Statement for Scribe #1: I, Dr. Lyon, reviewed documentation, as scribed by Brittany Mitchell in my presence, and it is both accurate and complete.                           Clinical Impression:     1. Vomiting and diarrhea    2. Vomiting            ED Disposition Condition    Discharge Stable        ED Prescriptions     None        Follow-up Information     Follow up With Specialties Details Why Contact Info    Primary Care Physician                                           Dee Dee Lyon MD  10/10/20 2102

## 2020-10-10 NOTE — ED NOTES
"Pt states "I need to leave, my  has to leave so I need to get home." MD aware. Pt AAOx4 and appropriate at this time. Respirations even and unlabored. No acute distress noted. Pt states she is going home with friend.   "

## 2020-10-10 NOTE — ED NOTES
"Pt to ED with nausea, vomiting x several days. Diarrhea reported yesterday and pain to RLQ abd, states "its like a soreness from vomiting." Denies urinary s/s. Pt was seen here several days ago for same cc. States hx of marijuana abuse but hasn't "smoked in a couple days." Pt AAOx4 and appropriate at this time. Respirations even and unlabored. No acute distress noted.    "

## 2023-03-29 ENCOUNTER — OFFICE VISIT (OUTPATIENT)
Dept: OBSTETRICS AND GYNECOLOGY | Facility: CLINIC | Age: 33
End: 2023-03-29
Payer: MEDICAID

## 2023-03-29 VITALS
DIASTOLIC BLOOD PRESSURE: 62 MMHG | WEIGHT: 123.56 LBS | HEIGHT: 67 IN | SYSTOLIC BLOOD PRESSURE: 112 MMHG | BODY MASS INDEX: 19.39 KG/M2

## 2023-03-29 DIAGNOSIS — N64.4 BREAST PAIN, LEFT: Primary | ICD-10-CM

## 2023-03-29 PROCEDURE — 1159F PR MEDICATION LIST DOCUMENTED IN MEDICAL RECORD: ICD-10-PCS | Mod: CPTII,,,

## 2023-03-29 PROCEDURE — 3074F SYST BP LT 130 MM HG: CPT | Mod: CPTII,,,

## 2023-03-29 PROCEDURE — 99999 PR PBB SHADOW E&M-EST. PATIENT-LVL III: CPT | Mod: PBBFAC,,,

## 2023-03-29 PROCEDURE — 99213 OFFICE O/P EST LOW 20 MIN: CPT | Mod: PBBFAC

## 2023-03-29 PROCEDURE — 3074F PR MOST RECENT SYSTOLIC BLOOD PRESSURE < 130 MM HG: ICD-10-PCS | Mod: CPTII,,,

## 2023-03-29 PROCEDURE — 3078F DIAST BP <80 MM HG: CPT | Mod: CPTII,,,

## 2023-03-29 PROCEDURE — 99999 PR PBB SHADOW E&M-EST. PATIENT-LVL III: ICD-10-PCS | Mod: PBBFAC,,,

## 2023-03-29 PROCEDURE — 99202 PR OFFICE/OUTPT VISIT, NEW, LEVL II, 15-29 MIN: ICD-10-PCS | Mod: S$PBB,,,

## 2023-03-29 PROCEDURE — 1159F MED LIST DOCD IN RCRD: CPT | Mod: CPTII,,,

## 2023-03-29 PROCEDURE — 3078F PR MOST RECENT DIASTOLIC BLOOD PRESSURE < 80 MM HG: ICD-10-PCS | Mod: CPTII,,,

## 2023-03-29 PROCEDURE — 3008F PR BODY MASS INDEX (BMI) DOCUMENTED: ICD-10-PCS | Mod: CPTII,,,

## 2023-03-29 PROCEDURE — 3008F BODY MASS INDEX DOCD: CPT | Mod: CPTII,,,

## 2023-03-29 PROCEDURE — 99202 OFFICE O/P NEW SF 15 MIN: CPT | Mod: S$PBB,,,

## 2023-03-29 NOTE — PROGRESS NOTES
Subjective:      Patient ID: Reggie Hargrove is a 32 y.o. female.    Chief Complaint:  Breast Pain (Left breast pain, months)      History of Present Illness  HPI  Patient is a G1 P 1 that presents for evaluation of a left breast pain. Change was noted 2 months ago. She originally thought it was tenderness due to her menstrual cycles. However, the pain has not subsided after two cycles.   Patient does not routinely do self breast exams.   Last menstrual period was 3/2/2023.   Patient denies nipple discharge. Patient has a personal history of breast cancer (both maternal and paternal grandparents).   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    GYN & OB History  Patient's last menstrual period was 2023 (approximate).   Date of Last Pap: No result found    OB History    Para Term  AB Living   1 1 1     1   SAB IAB Ectopic Multiple Live Births         0 1      # Outcome Date GA Lbr Magnus/2nd Weight Sex Delivery Anes PTL Lv   1 Term 18 40w0d / 01:16 2.97 kg (6 lb 8.8 oz) F Vag-Spont EPI N IDA       Review of Systems  Review of Systems   Constitutional:  Negative for activity change and appetite change.   Respiratory:  Negative for shortness of breath.    Cardiovascular:  Negative for chest pain.   Gastrointestinal:  Negative for abdominal pain, diarrhea and nausea.   Genitourinary:  Negative for bladder incontinence, decreased libido, dysmenorrhea, dyspareunia, dysuria, flank pain, frequency, genital sores, hematuria, hot flashes, menorrhagia, menstrual problem, pelvic pain, urgency, vaginal bleeding, vaginal discharge, vaginal pain, urinary incontinence, postcoital bleeding, postmenopausal bleeding, vaginal dryness and vaginal odor.   Integumentary:  Positive for breast tenderness. Negative for breast mass, nipple discharge and breast skin changes.   Neurological:  Negative for headaches.   Breast: Positive for tenderness.Negative for asymmetry, breast self exam, lump, mass, nipple discharge and  skin changes       Objective:     Physical Exam:   Constitutional: She is oriented to person, place, and time. She appears well-developed.    HENT:   Head: Normocephalic and atraumatic.    Eyes: EOM are normal.     Cardiovascular:  Normal rate.             Pulmonary/Chest: Effort normal.            Abdominal: Soft. There is no abdominal tenderness.             Musculoskeletal: Normal range of motion.       Neurological: She is oriented to person, place, and time.    Skin: Skin is warm.    Psychiatric: She has a normal mood and affect.       Assessment:     1. Breast pain, left            Plan:     1. Breast pain, left  - US Breast Left Complete; Future    Discussed that pain/ tenderness could be muscular due to her line of work. We will perform US due to family hx of breast cancer.  - eliminate caffeine as much as possible  - warm compresses     Pt request to reschedule her WWE at a later date.

## 2023-03-29 NOTE — PROGRESS NOTES
Subjective:      Patient ID: Reggie Hargrove is a 32 y.o. female.    Chief Complaint:  Breast Pain (Left breast pain, months)      History of Present Illness  HPI  Annual Exam-Premenopausal  Patient presents for annual exam. The patient has no complaints today.     The patient {is/is not/has never been:24847} sexually active. GYN screening history: {gyn screen history:96164}. The patient wears seatbelts: {yes/no:993252}. The patient participates in regular exercise: {yes/no/not asked:9010}. Has the patient ever been transfused or tattooed?: {yes/no/not asked:9010}. The patient reports that there {is/is not:0524} domestic violence in her life.    GYN & OB History  Patient's last menstrual period was 2023 (approximate).   Date of Last Pap: No result found    OB History    Para Term  AB Living   1 1 1     1   SAB IAB Ectopic Multiple Live Births         0 1      # Outcome Date GA Lbr Magnus/2nd Weight Sex Delivery Anes PTL Lv   1 Term 18 40w0d / 01:16 2.97 kg (6 lb 8.8 oz) F Vag-Spont EPI N IDA       Review of Systems  Review of Systems       Objective:     Physical Exam      Assessment:     1. Encounter for gynecological examination without abnormal finding    2. Screening breast examination       ***        Plan:     ***

## 2023-04-12 ENCOUNTER — HOSPITAL ENCOUNTER (OUTPATIENT)
Dept: RADIOLOGY | Facility: HOSPITAL | Age: 33
Discharge: HOME | End: 2023-04-12
Payer: MEDICAID

## 2023-04-12 VITALS — BODY MASS INDEX: 19.3 KG/M2 | WEIGHT: 123 LBS | HEIGHT: 67 IN

## 2023-04-12 DIAGNOSIS — N64.4 BREAST PAIN, LEFT: ICD-10-CM

## 2023-04-12 PROCEDURE — 76642 US BREAST LEFT LIMITED: ICD-10-PCS | Mod: 26,LT,, | Performed by: RADIOLOGY

## 2023-04-12 PROCEDURE — 76642 ULTRASOUND BREAST LIMITED: CPT | Mod: 26,LT,, | Performed by: RADIOLOGY

## 2023-04-12 PROCEDURE — 76642 ULTRASOUND BREAST LIMITED: CPT | Mod: TC,LT

## 2023-04-14 DIAGNOSIS — Z14.8 CARRIER OF HIGH RISK CANCER GENE MUTATION: Primary | ICD-10-CM

## 2023-04-24 ENCOUNTER — PATIENT MESSAGE (OUTPATIENT)
Dept: OBSTETRICS AND GYNECOLOGY | Facility: CLINIC | Age: 33
End: 2023-04-24
Payer: MEDICAID

## 2024-05-14 PROBLEM — J10.1 INFLUENZA A: Status: ACTIVE | Noted: 2024-05-14

## 2024-05-14 PROBLEM — K59.00 CONSTIPATION: Status: ACTIVE | Noted: 2024-05-14

## 2025-03-19 ENCOUNTER — OFFICE VISIT (OUTPATIENT)
Dept: OBSTETRICS AND GYNECOLOGY | Facility: CLINIC | Age: 35
End: 2025-03-19
Payer: MEDICAID

## 2025-03-19 VITALS — BODY MASS INDEX: 19.35 KG/M2 | WEIGHT: 123.56 LBS

## 2025-03-19 DIAGNOSIS — Z14.8 CARRIER OF HIGH RISK CANCER GENE MUTATION: ICD-10-CM

## 2025-03-19 DIAGNOSIS — Z01.419 ENCOUNTER FOR GYNECOLOGICAL EXAMINATION WITHOUT ABNORMAL FINDING: Primary | ICD-10-CM

## 2025-03-19 DIAGNOSIS — Z12.31 BREAST CANCER SCREENING BY MAMMOGRAM: ICD-10-CM

## 2025-03-19 PROCEDURE — 87624 HPV HI-RISK TYP POOLED RSLT: CPT

## 2025-03-19 PROCEDURE — 1159F MED LIST DOCD IN RCRD: CPT | Mod: CPTII,,,

## 2025-03-19 PROCEDURE — 99999 PR PBB SHADOW E&M-EST. PATIENT-LVL III: CPT | Mod: PBBFAC,,,

## 2025-03-19 PROCEDURE — 87591 N.GONORRHOEAE DNA AMP PROB: CPT

## 2025-03-19 PROCEDURE — 99395 PREV VISIT EST AGE 18-39: CPT | Mod: S$PBB,,,

## 2025-03-19 PROCEDURE — 3008F BODY MASS INDEX DOCD: CPT | Mod: CPTII,,,

## 2025-03-19 PROCEDURE — 99213 OFFICE O/P EST LOW 20 MIN: CPT | Mod: PBBFAC

## 2025-03-19 NOTE — PROGRESS NOTES
CC: Annual  HISTORY OF PRESENT ILLNESS:    Reggie Hargrove is a 34 y.o. female, , Patient's last menstrual period was 2025.,  presents for a routine exam and has no complaints.   She is sexually active. She uses NFP for contraception.  History of STDs: DENIES.  She does want STD screening.  Denies any other GYN complaints.      The patient participates in regular exercise: NO.  The patient does not smoke.  The patient wears seatbelts.   Pt denies any domestic violence. REPORTS tattoos or blood transfusions    SCREENING:   Pap: 2023 ASCUS/ HPV+ COLP NEG  (REPORTS RECENT PAP WAS POSITIVE FOR HPV BUT UNABLE TO FIND RECORDS) WILL REDO PAP/ HPV TODAY  Gardasil Status: PT REPORTS NEVER DONE (HPV VIS GIVEN AND EXPLAINED- SHE WILL REVIEW AND LET US KNOW)  Mammogram: N/A (YEARLY MMG RECOMMENDED DUE TO HIGH RISK GENE CARRIER- ORDERED)  Colonoscopy: N/A   DEXA:  N/A     GYN FH:   Breast cancer: MGM, PGM AND MAT GREAT GM  Colon cancer: MGM, PGM AND MAT GREAT GM  Ovarian cancer: MGM  Endometrial cancer: none     DAUGHTER IN ROOM FOR APPT.   OB History    Para Term  AB Living   1 1 1   1   SAB IAB Ectopic Multiple Live Births      0 1      # Outcome Date GA Lbr Magnus/2nd Weight Sex Type Anes PTL Lv   1 Term 18 40w0d / 01:16 2.97 kg (6 lb 8.8 oz) F Vag-Spont EPI N IDA        Past Medical History:  History reviewed. No pertinent past medical history.    Past Surgical History:  Past Surgical History:   Procedure Laterality Date    MANDIBLE SURGERY         Family History:  Family History   Problem Relation Name Age of Onset    Breast cancer Maternal Grandmother      Colon cancer Maternal Grandmother      Ovarian cancer Maternal Grandmother      Colon cancer Paternal Grandmother         Allergies:  Review of patient's allergies indicates:   Allergen Reactions    Penicillins Anaphylaxis and Hives       Medications:  Medications Ordered Prior to Encounter[1]    Social History:  Social History[2]             ROS:   GENERAL: Feeling well overall. Denies fever or chills.   SKIN: Denies rash or lesions.   HEAD: Denies head injury or headache.   NODES: Denies enlarged lymph nodes.   CHEST: Denies chest pain or shortness of breath.   CARDIOVASCULAR: Denies palpitations or left sided chest pain.   ABDOMEN: No abdominal pain, constipation, diarrhea, nausea, vomiting or rectal bleeding.   URINARY: No dysuria, hematuria, or burning on urination.  REPRODUCTIVE: See HPI.   BREASTS: Denies pain, lumps, or nipple discharge.   HEMATOLOGIC: No easy bruisability or excessive bleeding.   MUSCULOSKELETAL: Denies joint pain or swelling.   NEUROLOGIC: Denies syncope or weakness.   PSYCHIATRIC: Denies depression, anxiety or mood swings.    PE:   APPEARANCE: Well nourished, well developed, White female in no acute distress.  NODES: no cervical, supraclavicular, or inguinal lymphadenopathy  BREASTS: Symmetrical, no skin changes or visible lesions. No palpable masses, nipple discharge or adenopathy bilaterally.  ABDOMEN: Soft. No tenderness or masses. No distention. No hernias palpated. No CVA tenderness.  VULVA: No lesions. Normal external female genitalia.  URETHRAL MEATUS: Normal size and location, no lesions, no prolapse.  URETHRA: No masses, tenderness, or prolapse.  VAGINA:  Moist. No lesions or lacerations noted. No abnormal discharge present. No odor present.   CERVIX: No lesions or discharge. No cervical motion tenderness.   UTERUS: Normal size, regular shape, mobile, non-tender.  ADNEXA: No tenderness. No fullness or masses palpated in the adnexal regions.   ANUS PERINEUM: Normal.      Diagnosis:  1. Encounter for gynecological examination without abnormal finding    2. Breast cancer screening by mammogram    3. 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 trachomatis/N. gonorrhoeae by AMP DNA     Liquid-Based Pap Smear, Screening       - Pap and HPV done today.  - Screening tests as ordered.  - Diet and exercise encouraged.  Condom use encouraged for STD prevention.  Seat belt use encouraged.  Reviewed ASCCP Pap guidelines and screening recommendations.  Calcium and vitamin D recommended.     Counseling: HPV vaccine  injury prevention: Driving under the influence of alcohol  Weapons  Seatbelts  Bicycle helmets  Adequate sleep  Exercise  Stress management techniques  indications for and frequency of periodic gynecologic exam  reviewed current Pap guidelines. Explained new understanding of natural history of cervical disease and improved Paps. Recommended guideline concordant care.    The patient was counseled today on ACS PAP guidelines, with recommendations for yearly pelvic exams unless their uterus, cervix, and ovaries were removed for benign reasons; in that case, examinations every 3-5 years are recommended.  The patient was also counseled regarding monthly breast self-examination, routine STD screening for at-risk populations, prophylactic immunizations for transmitted infections such as  HPV, Pertussis, or Influenza as appropriate, and yearly mammograms when indicated by ACS guidelines.  She was advised to see her primary care physician for all other health maintenance.    Counseling session lasted approximately 10 minutes, and all her questions were answered.    Follow-up with me in 1 year for routine exam or sooner if needed.    WALTER Osborne-BC                 [1]   Current Outpatient Medications on File Prior to Visit   Medication Sig Dispense Refill    ibuprofen (ADVIL,MOTRIN) 600 MG tablet Take 1 tablet (600 mg total) by mouth every 6 (six) hours. 90 tablet 0    LIDOcaine (LIDODERM) 5 % Place 1 patch onto the skin once daily. Remove & Discard patch within 12 hours or as directed by MD 15 patch 0    naproxen (NAPROSYN) 500 MG tablet Take 1 tablet (500 mg total) by mouth 2 (two) times daily as  needed. 60 tablet 0    oxyCODONE-acetaminophen (PERCOCET) 5-325 mg per tablet Take 1 tablet by mouth every 4 (four) hours as needed. 30 tablet 0     No current facility-administered medications on file prior to visit.   [2]   Social History  Tobacco Use    Smoking status: Never    Smokeless tobacco: Never   Substance Use Topics    Alcohol use: Yes     Comment: socially    Drug use: Not Currently     Types: Marijuana

## 2025-03-21 ENCOUNTER — OFFICE VISIT (OUTPATIENT)
Dept: PRIMARY CARE CLINIC | Facility: CLINIC | Age: 35
End: 2025-03-21
Payer: MEDICAID

## 2025-03-21 VITALS
DIASTOLIC BLOOD PRESSURE: 71 MMHG | HEART RATE: 71 BPM | BODY MASS INDEX: 19.43 KG/M2 | WEIGHT: 123.81 LBS | HEIGHT: 67 IN | RESPIRATION RATE: 16 BRPM | OXYGEN SATURATION: 100 % | SYSTOLIC BLOOD PRESSURE: 121 MMHG

## 2025-03-21 DIAGNOSIS — L21.9 SEBORRHEIC DERMATITIS: ICD-10-CM

## 2025-03-21 DIAGNOSIS — L98.9 SCALP LESION: ICD-10-CM

## 2025-03-21 DIAGNOSIS — Z76.89 ENCOUNTER TO ESTABLISH CARE: Primary | ICD-10-CM

## 2025-03-21 DIAGNOSIS — Z00.00 ADULT GENERAL MEDICAL EXAMINATION: ICD-10-CM

## 2025-03-21 LAB
C TRACH DNA SPEC QL NAA+PROBE: NOT DETECTED
N GONORRHOEA DNA SPEC QL NAA+PROBE: NOT DETECTED

## 2025-03-21 PROCEDURE — 99999 PR PBB SHADOW E&M-EST. PATIENT-LVL IV: CPT | Mod: PBBFAC,,,

## 2025-03-21 PROCEDURE — 99214 OFFICE O/P EST MOD 30 MIN: CPT | Mod: PBBFAC,PN

## 2025-03-21 NOTE — PROGRESS NOTES
Subjective     Patient ID: Reggie Hargrove is a 34 y.o. female.    Chief Complaint: Establish Care      Reggie Hargrove is a 34 year old female, presents to clinic for general medical exam.  New to me.  No PCP.  Medical and surgical history, in addition to problem list reviewed and listed below.    Reports in progress of getting all medical visits in: had gynecology and dental exams done.  Plan for vision exam.  States [place] in [facility].      Voice concern about area in scalp that been there for while, at least a year; been getting more dandruff to the area.  Made an attempt to squeeze area but nothing came out.  Uses Pantene sulfate free shampoo and conditioner.  Last dyed hair about one month ago.    Reports had oral surgery done in the past and had an option to get removed extra tissue in the back of throat; deny pain or problems (states want to make sure is okay).  Plan to follow up with services that did oral interventions.      States been told in the past that she has inability to absorb potassium and magnesium, not sure of if told a deficiency.      Hair/Scalp Problem  This is a new problem. The current episode started more than 1 year ago. The problem has been unchanged (area has not grown). Associated symptoms include nausea (bouts of nausea due to marijuana) and vomiting (bouts due to marijuana use). Pertinent negatives include no abdominal pain, anorexia, arthralgias, change in bowel habit, chest pain, chills, congestion, coughing, diaphoresis, fatigue, fever, headaches, joint swelling, myalgias, neck pain, numbness, rash, sore throat, swollen glands, urinary symptoms, vertigo, visual change or weakness. Associated symptoms comments: Has more dandruff to area. Nothing aggravates the symptoms. She has tried nothing for the symptoms.     Review of Systems   Constitutional:  Negative for chills, diaphoresis, fatigue and  "fever.   HENT:  Negative for nasal congestion, ear pain, hearing loss, nosebleeds, sore throat, tinnitus and trouble swallowing.    Eyes:  Negative for photophobia, pain and discharge.   Respiratory:  Negative for cough, chest tightness and shortness of breath.    Cardiovascular:  Negative for chest pain, palpitations and leg swelling.   Gastrointestinal:  Positive for nausea (bouts of nausea due to marijuana) and vomiting (bouts due to marijuana use). Negative for abdominal pain, anorexia, change in bowel habit, constipation, diarrhea and reflux.   Genitourinary:  Negative for difficulty urinating, flank pain, menstrual irregularity and pelvic pain.   Musculoskeletal:  Negative for arthralgias, back pain, gait problem, joint swelling, leg pain, myalgias, neck pain and neck stiffness.   Integumentary:  Negative for rash.   Allergic/Immunologic: Negative for food allergies and immunocompromised state.   Neurological:  Negative for dizziness, vertigo, seizures, syncope, speech difficulty, weakness, numbness and headaches.   Psychiatric/Behavioral:  Negative for dysphoric mood, self-injury, sleep disturbance and suicidal ideas.      History reviewed. No pertinent past medical history.  Past Surgical History:   Procedure Laterality Date    MANDIBLE SURGERY       Social History[1]    Review of patient's allergies indicates:   Allergen Reactions    Penicillins Anaphylaxis and Hives     Problem List[2]       Objective   Vitals:    03/21/25 0941   BP: 121/71   BP Location: Left arm   Patient Position: Sitting   Pulse: 71   Resp: 16   SpO2: 100%   Weight: 56.1 kg (123 lb 12.6 oz)   Height: 5' 7" (1.702 m)       Physical Exam  Constitutional:       General: She is not in acute distress.     Appearance: Normal appearance.   HENT:      Head: Normocephalic and atraumatic.      Comments: Dispersed dandruff with more pronounced to lesion area.  Pink lesion to scalp: See image     Right Ear: Tympanic membrane, ear canal and " external ear normal.      Left Ear: Tympanic membrane, ear canal and external ear normal.      Nose: Nose normal.      Mouth/Throat:      Mouth: Mucous membranes are moist.      Pharynx: Oropharynx is clear. No oropharyngeal exudate or posterior oropharyngeal erythema.     Eyes:      Extraocular Movements: Extraocular movements intact.      Conjunctiva/sclera: Conjunctivae normal.      Pupils: Pupils are equal, round, and reactive to light.   Cardiovascular:      Rate and Rhythm: Normal rate and regular rhythm.      Pulses: Normal pulses.      Heart sounds: Normal heart sounds.   Pulmonary:      Effort: Pulmonary effort is normal. No respiratory distress.      Breath sounds: Normal breath sounds.   Abdominal:      General: Bowel sounds are normal.      Palpations: Abdomen is soft.   Musculoskeletal:         General: Normal range of motion.      Cervical back: Normal range of motion and neck supple.      Right lower leg: No edema.      Left lower leg: No edema.   Skin:     General: Skin is warm and dry.      Capillary Refill: Capillary refill takes less than 2 seconds.      Findings: No rash.   Neurological:      Mental Status: She is alert and oriented to person, place, and time.   Psychiatric:         Mood and Affect: Mood normal.         Behavior: Behavior normal.                Assessment and Plan     1. Encounter to establish care    2. Adult general medical examination  -     CBC Auto Differential; Future; Expected date: 03/21/2025  -     Comprehensive Metabolic Panel; Future; Expected date: 03/21/2025  -     Lipid Panel; Future; Expected date: 03/21/2025  -     Magnesium; Future; Expected date: 03/21/2025    3. Scalp lesion  -     E-Consult to Dermatology  - Keep scalp clean. Avoid manipulating or picking at area. Get re-evaluated if increased pain, swelling, warmth, oozing of blood and/or pus from wound or if fever occurs.  Avoid use of hair dye.    4. Seborrheic dermatitis  Continue with same shampoo. Avoid  dye application as discussed.    Medications Ordered Prior to Encounter[3]         Follow up if symptoms worsen or fail to improve.    I spent a total of 54 minutes on the day of the visit.  This includes face to face time and non-face to face time preparing to see the patient (e.g., review of tests), obtaining and/or reviewing separately obtained history, documenting clinical information in the electronic or other health record, independently interpreting results and communicating results to the patient/family/caregiver, or care coordinator.    Lori A. Stephens Sandifer, FNP-ANGE         [1]   Social History  Socioeconomic History    Marital status: Significant Other   Tobacco Use    Smoking status: Never    Smokeless tobacco: Never   Substance and Sexual Activity    Alcohol use: Yes     Comment: socially    Drug use: Not Currently     Types: Marijuana    Sexual activity: Yes   Social History Narrative    SHE IS A  AND    [2]   Patient Active Problem List  Diagnosis    Influenza A    Constipation   [3]   Current Outpatient Medications on File Prior to Visit   Medication Sig Dispense Refill    LIDOcaine (LIDODERM) 5 % Place 1 patch onto the skin once daily. Remove & Discard patch within 12 hours or as directed by MD (Patient not taking: Reported on 3/21/2025) 15 patch 0    naproxen (NAPROSYN) 500 MG tablet Take 1 tablet (500 mg total) by mouth 2 (two) times daily as needed. (Patient not taking: Reported on 3/21/2025) 60 tablet 0     No current facility-administered medications on file prior to visit.

## 2025-03-24 ENCOUNTER — PATIENT MESSAGE (OUTPATIENT)
Dept: OBSTETRICS AND GYNECOLOGY | Facility: CLINIC | Age: 35
End: 2025-03-24
Payer: MEDICAID

## 2025-03-24 ENCOUNTER — RESULTS FOLLOW-UP (OUTPATIENT)
Dept: OBSTETRICS AND GYNECOLOGY | Facility: CLINIC | Age: 35
End: 2025-03-24

## 2025-03-24 DIAGNOSIS — N76.0 BV (BACTERIAL VAGINOSIS): Primary | ICD-10-CM

## 2025-03-24 DIAGNOSIS — B96.89 BV (BACTERIAL VAGINOSIS): Primary | ICD-10-CM

## 2025-03-24 RX ORDER — METRONIDAZOLE 500 MG/1
500 TABLET ORAL 2 TIMES DAILY
Qty: 14 TABLET | Refills: 0 | Status: SHIPPED | OUTPATIENT
Start: 2025-03-24 | End: 2025-03-31

## 2025-04-02 ENCOUNTER — E-CONSULT (OUTPATIENT)
Dept: DERMATOLOGY | Facility: CLINIC | Age: 35
End: 2025-04-02

## 2025-04-02 DIAGNOSIS — D48.5 NEOPLASM OF UNCERTAIN BEHAVIOR OF SKIN: Primary | ICD-10-CM

## 2025-04-02 DIAGNOSIS — L98.9 SCALP LESION: Primary | ICD-10-CM

## 2025-04-02 NOTE — CONSULTS
[facility]  Response for E-Consult     Patient Name: Reggie Hargrove  MRN: [id number]  Primary Care Provider: Bhavana, Primary Doctor   Requesting Provider: Sandifer, Lori Ann Step*  Consults    Unfortunately, this eConsult has been declined due to: Recommend in person dermatology visit. Unable to identify via photos    Other:  This eConsult submission cannot be completed at this time due to Recommend in person dermatology referral. Difficult to evaluate lesion without dermoscopy.      Thank you for this eConsult referral.     Barbara Gallegos MD  [facility]

## 2025-04-08 ENCOUNTER — TELEPHONE (OUTPATIENT)
Dept: PRIMARY CARE CLINIC | Facility: CLINIC | Age: 35
End: 2025-04-08
Payer: MEDICAID

## 2025-04-08 ENCOUNTER — TELEPHONE (OUTPATIENT)
Dept: OBSTETRICS AND GYNECOLOGY | Facility: CLINIC | Age: 35
End: 2025-04-08
Payer: MEDICAID

## 2025-04-08 NOTE — TELEPHONE ENCOUNTER
----- Message from Nurse Lars sent at 4/8/2025 10:29 AM CDT -----  Regarding: Reggie Hargrove MRN: [id number]  Contact: [phone] Patient  Patient called concerning her labs. Thanks!  ----- Message -----  From: Suzie Patricio  Sent: 4/8/2025   8:06 AM CDT  To: Sandifer Lori Staff    2TESTRESULTSType: Test ResultsWhat test was performed? LabsWho ordered the test? Lori Sandifer NPWhen and where were the test performed? 03/27/2025 [facility] LabWould you like a call back and or thru [facility]: Call Back Please. Thank youComments:

## 2025-04-11 ENCOUNTER — TELEPHONE (OUTPATIENT)
Dept: OBSTETRICS AND GYNECOLOGY | Facility: CLINIC | Age: 35
End: 2025-04-11
Payer: MEDICAID

## 2025-04-11 NOTE — TELEPHONE ENCOUNTER
----- Message from Lily sent at 4/11/2025  2:37 PM CDT -----  Type:  Patient Advice Who Called:ptDoes the patient know what this is regarding?:HPV results and colposcopy Would the patient rather a call back or a response via [facility]? [facility] Call Back Number: [phone] Information: pt would like to speak with the office regarding if she needs to get a colposcopy

## 2025-05-12 ENCOUNTER — HOSPITAL ENCOUNTER (OUTPATIENT)
Dept: RADIOLOGY | Facility: HOSPITAL | Age: 35
Discharge: HOME | End: 2025-05-12
Payer: MEDICAID

## 2025-05-12 DIAGNOSIS — Z14.8 CARRIER OF HIGH RISK CANCER GENE MUTATION: ICD-10-CM

## 2025-05-12 DIAGNOSIS — Z12.31 BREAST CANCER SCREENING BY MAMMOGRAM: ICD-10-CM

## 2025-05-12 PROCEDURE — 77067 SCR MAMMO BI INCL CAD: CPT | Mod: TC

## 2025-05-12 PROCEDURE — 77063 BREAST TOMOSYNTHESIS BI: CPT | Mod: 26,,, | Performed by: RADIOLOGY

## 2025-05-12 PROCEDURE — 77067 SCR MAMMO BI INCL CAD: CPT | Mod: 26,,, | Performed by: RADIOLOGY

## 2025-05-13 ENCOUNTER — TELEPHONE (OUTPATIENT)
Dept: OBSTETRICS AND GYNECOLOGY | Facility: CLINIC | Age: 35
End: 2025-05-13
Payer: MEDICAID

## 2025-05-13 NOTE — TELEPHONE ENCOUNTER
----- Message from Savi sent at 5/13/2025  2:43 PM CDT -----  Type: Patient Call BackWho called:pt What is the request in detail:pt requesting to speak to nurse in regards to whether or not she needs to have colposcopy done again in regards to hpv and bacteria vaginosis. Please call pt Can the clinic reply by [facility]?Would the patient rather a call back or a response via [facility]? [facility] call back number:[phone] ([place]) Additional Information:  ----- Message -----  From: Savi Whelan  Sent: 5/13/2025   2:44 PM CDT  To: Kelvin Barnett    Type: Patient Call BackWho called:pt What is the request in detail:pt requesting to speak to nurse in regards to whether or not she needs to have colposcopy done again in regards to bacteria vaginosis. Please call pt Can the clinic reply by [facility]?Would the patient rather a call back or a response via [facility]? [facility] call back number:[phone] (home) Additional Information:

## 2025-06-09 ENCOUNTER — OFFICE VISIT (OUTPATIENT)
Dept: OBSTETRICS AND GYNECOLOGY | Facility: CLINIC | Age: 35
End: 2025-06-09
Payer: MEDICAID

## 2025-06-09 ENCOUNTER — LAB VISIT (OUTPATIENT)
Dept: LAB | Facility: OTHER | Age: 35
End: 2025-06-09
Payer: MEDICAID

## 2025-06-09 VITALS
DIASTOLIC BLOOD PRESSURE: 60 MMHG | WEIGHT: 121.81 LBS | BODY MASS INDEX: 19.08 KG/M2 | SYSTOLIC BLOOD PRESSURE: 110 MMHG

## 2025-06-09 DIAGNOSIS — Z11.3 SCREENING FOR STDS (SEXUALLY TRANSMITTED DISEASES): ICD-10-CM

## 2025-06-09 DIAGNOSIS — N89.8 VAGINAL DISCHARGE: ICD-10-CM

## 2025-06-09 DIAGNOSIS — Z11.3 SCREENING FOR STDS (SEXUALLY TRANSMITTED DISEASES): Primary | ICD-10-CM

## 2025-06-09 LAB
HBV SURFACE AG SERPL QL IA: NORMAL
HIV 1+2 AB+HIV1 P24 AG SERPL QL IA: NEGATIVE
T PALLIDUM IGG+IGM SER QL: NEGATIVE

## 2025-06-09 PROCEDURE — 99212 OFFICE O/P EST SF 10 MIN: CPT | Mod: PBBFAC

## 2025-06-09 PROCEDURE — 87491 CHLMYD TRACH DNA AMP PROBE: CPT

## 2025-06-09 PROCEDURE — 3008F BODY MASS INDEX DOCD: CPT | Mod: CPTII,,,

## 2025-06-09 PROCEDURE — 87340 HEPATITIS B SURFACE AG IA: CPT

## 2025-06-09 PROCEDURE — 87389 HIV-1 AG W/HIV-1&-2 AB AG IA: CPT

## 2025-06-09 PROCEDURE — 99213 OFFICE O/P EST LOW 20 MIN: CPT | Mod: S$PBB,,,

## 2025-06-09 PROCEDURE — 36415 COLL VENOUS BLD VENIPUNCTURE: CPT

## 2025-06-09 PROCEDURE — 3074F SYST BP LT 130 MM HG: CPT | Mod: CPTII,,,

## 2025-06-09 PROCEDURE — 3078F DIAST BP <80 MM HG: CPT | Mod: CPTII,,,

## 2025-06-09 PROCEDURE — 99999 PR PBB SHADOW E&M-EST. PATIENT-LVL II: CPT | Mod: PBBFAC,,,

## 2025-06-09 PROCEDURE — 81515 NFCT DS BV&VAGINITIS DNA ALG: CPT

## 2025-06-09 PROCEDURE — 1159F MED LIST DOCD IN RCRD: CPT | Mod: CPTII,,,

## 2025-06-09 PROCEDURE — 86593 SYPHILIS TEST NON-TREP QUANT: CPT

## 2025-06-09 NOTE — PROGRESS NOTES
Chief Complaint: STD testing     HPI:      Reggie Hargrove is a 34 y.o.  who presents today for STD testing. She is requesting GC/CT today and STD blood work. Sexually active with single male partner. Does not use condoms. Treated for BV in March. No symptoms today - denies abnormal vaginal discharge, itching, irritation, odor, or pelvic pain.. Would like affirm as well to ensure BV is resolved.  Has some questions regarding previous pap smears and screening moving forward. Last pap normal, HPV neg (2025). Hx of ASCUS/HPV + (2023), Colpo CIN1 (2023).    Physical Exam:      PHYSICAL EXAM:  /60 (BP Location: Left arm, Patient Position: Sitting)   Wt 55.3 kg (121 lb 12.9 oz)   LMP 2025   BMI 19.08 kg/m²   Body mass index is 19.08 kg/m².     APPEARANCE: Well nourished, well developed, in no acute distress.  PELVIC:  External genitalia and urethra within normal limits. Vagina without lesions, without erythema, without ulcers. Scant amount of mucosal discharge present. Cervix without cervical motion tenderness, non-friable. Uterus: normal size, mobile, non-tender. No adnexal masses or tenderness palpated.    Assessment/Plan:     Screening for STDs (sexually transmitted diseases)  -     HIV 1/2 Ag/Ab (4th Gen); Future; Expected date: 2025  -     Hepatitis B Surface Antigen; Future; Expected date: 2025  -     Treponema Pallidium Antibodies IgG, IgM; Future; Expected date: 2025  -     C. trachomatis/N. gonorrhoeae by AMP DNA [facility]; Cervicovaginal    Vaginal discharge  -     Vaginosis Screen by DNA Probe    Exam unimpressive. Affirm and GCC collected. Blood panel ordered.  Patient was counseled today on current ASCCP pap guidelines. Plan for repeat Pap/HPV next year.    Follow up PRN.    Lara Evans (Maggie), JUDIE  Obstetrics and Gynecology  [facility]

## 2025-06-10 ENCOUNTER — TELEPHONE (OUTPATIENT)
Dept: OBSTETRICS AND GYNECOLOGY | Facility: CLINIC | Age: 35
End: 2025-06-10
Payer: MEDICAID

## 2025-06-10 LAB
BACTERIAL VAGINOSIS DNA (OHS): DETECTED
C TRACH DNA SPEC QL NAA+PROBE: NOT DETECTED
CANDIDA GLABRATA/KRUSEI DNA (OHS): NOT DETECTED
CANDIDA SPECIES DNA (OHS): NOT DETECTED
CTGC SOURCE (OHS) ORD-325: NORMAL
N GONORRHOEA DNA UR QL NAA+PROBE: NOT DETECTED
TRICHOMONAS VAGINALIS DNA (OHS): NOT DETECTED

## 2025-06-10 NOTE — TELEPHONE ENCOUNTER
----- Message from Lara Evans DNP sent at 6/9/2025 11:02 AM CDT -----  Please schedule for HPV vaccine series. Thanks!

## 2025-06-11 ENCOUNTER — RESULTS FOLLOW-UP (OUTPATIENT)
Dept: OBSTETRICS AND GYNECOLOGY | Facility: CLINIC | Age: 35
End: 2025-06-11
Payer: MEDICAID

## 2025-06-11 ENCOUNTER — PATIENT MESSAGE (OUTPATIENT)
Dept: OBSTETRICS AND GYNECOLOGY | Facility: CLINIC | Age: 35
End: 2025-06-11
Payer: MEDICAID

## 2025-06-11 RX ORDER — FLUCONAZOLE 150 MG/1
150 TABLET ORAL ONCE
Qty: 2 TABLET | Refills: 0 | Status: SHIPPED | OUTPATIENT
Start: 2025-06-11 | End: 2025-06-11

## 2025-06-11 RX ORDER — METRONIDAZOLE 500 MG/1
500 TABLET ORAL 2 TIMES DAILY
Qty: 14 TABLET | Refills: 0 | Status: SHIPPED | OUTPATIENT
Start: 2025-06-11 | End: 2025-06-18

## 2025-06-17 NOTE — TELEPHONE ENCOUNTER
Health Maintenance Due   Topic Date Due    DTaP/Tdap/Td vaccine (1 - Tdap) Never done    Colorectal Cancer Screen  Never done    Shingles vaccine (1 of 2) Never done    Breast cancer screen  06/01/2023    A1C test (Diabetic or Prediabetic)  11/07/2023    COVID-19 Vaccine (2 - 2024-25 season) 09/01/2024    Lipids  10/07/2024        Labs reviewed; discussed with patient. Patient was given the opportunity to ask questions.  Patient voiced understanding of results and plan.  Informed patient Dermatology consult has been placed.  Replied someone has reached out; however, has preference for which provider Waiting on contact to schedule an appointment.  Informed the patient if appointment is way out when scheduling, have put on wait list for sooner; verbalized will.     cough vaccines. She is scheduled to discuss insurance coverage tomorrow at 3:30 PM.    She reports no issues with heat or cold intolerance, or changes in skin, hair, or nails. She maintains a healthy diet and engages in regular physical activity, including walking approximately a mile daily.    Her mood remains stable on Zoloft, with no feelings of depression, hopelessness, anxiety, or overwhelm.    She has been managing her respiratory symptoms with an inhaler and breathing treatments every 3 hours. She has requested a handicap placard due to difficulty breathing during ambulation. She was previously prescribed two different medications for her nebulizer, but her insurance only covered one. She recalls a recent episode where she experienced severe shortness of breath, to the point where she was unable to speak. A device that wraps around the chest and provides back percussion was recommended, but her insurance did not cover it.    She reports no chest pain, palpitations, shortness of breath with exertion, headaches, dizziness, or blurred vision.    Review of Systems   Constitutional:  Negative for activity change, appetite change and fever.   HENT:  Negative for congestion, ear pain and rhinorrhea.    Eyes:  Negative for discharge and visual disturbance.   Respiratory:  Positive for shortness of breath and wheezing. Negative for cough and chest tightness.    Cardiovascular:  Negative for chest pain and palpitations.   Gastrointestinal:  Negative for abdominal pain, constipation, diarrhea and vomiting.   Genitourinary:  Negative for difficulty urinating and hematuria.   Musculoskeletal:  Negative for arthralgias and myalgias.   Skin:  Negative for rash.   Neurological:  Negative for dizziness, weakness, numbness and headaches.   Psychiatric/Behavioral:  The patient is not nervous/anxious.           Objective   Blood pressure 118/70, pulse 85, temperature 97.9 °F (36.6 °C), temperature source Oral, resp. rate 18,